# Patient Record
Sex: FEMALE | Race: WHITE | NOT HISPANIC OR LATINO | URBAN - METROPOLITAN AREA
[De-identification: names, ages, dates, MRNs, and addresses within clinical notes are randomized per-mention and may not be internally consistent; named-entity substitution may affect disease eponyms.]

---

## 2023-03-21 LAB — CHORIOGONADOTROPIN (MIU/ML) IN SER/PLAS: 260 MIU/ML

## 2023-03-23 LAB — CHORIOGONADOTROPIN (MIU/ML) IN SER/PLAS: 819 IU/L

## 2023-04-07 LAB
ABO GROUP (TYPE) IN BLOOD: NORMAL
ANTIBODY SCREEN: NORMAL
ERYTHROCYTE DISTRIBUTION WIDTH (RATIO) BY AUTOMATED COUNT: 12.6 % (ref 11.5–14.5)
ERYTHROCYTE MEAN CORPUSCULAR HEMOGLOBIN CONCENTRATION (G/DL) BY AUTOMATED: 33.3 G/DL (ref 32–36)
ERYTHROCYTE MEAN CORPUSCULAR VOLUME (FL) BY AUTOMATED COUNT: 86 FL (ref 80–100)
ERYTHROCYTES (10*6/UL) IN BLOOD BY AUTOMATED COUNT: 4.48 X10E12/L (ref 4–5.2)
HEMATOCRIT (%) IN BLOOD BY AUTOMATED COUNT: 38.7 % (ref 36–46)
HEMOGLOBIN (G/DL) IN BLOOD: 12.9 G/DL (ref 12–16)
HEPATITIS B VIRUS SURFACE AG PRESENCE IN SERUM: NONREACTIVE
HEPATITIS C VIRUS AB PRESENCE IN SERUM: NONREACTIVE
HIV 1/ 2 AG/AB SCREEN: NONREACTIVE
LEUKOCYTES (10*3/UL) IN BLOOD BY AUTOMATED COUNT: 7.1 X10E9/L (ref 4.4–11.3)
NRBC (PER 100 WBCS) BY AUTOMATED COUNT: 0 /100 WBC (ref 0–0)
PLATELETS (10*3/UL) IN BLOOD AUTOMATED COUNT: 229 X10E9/L (ref 150–450)
REFLEX ADDED, ANEMIA PANEL: NORMAL
RH FACTOR: NORMAL
RUBELLA VIRUS IGG AB: POSITIVE
SYPHILIS TOTAL AB: NONREACTIVE
URINE CULTURE: NORMAL

## 2023-04-11 LAB
HEMOGLOBIN A2: 2.8 %
HEMOGLOBIN A: 96.8 %
HEMOGLOBIN F: 0.4 %
HEMOGLOBIN IDENTIFICATION INTERPRETATION: NORMAL
PATH REVIEW-HGB IDENTIFICATION: NORMAL

## 2023-05-11 LAB — SMA RESULT: NORMAL

## 2023-05-12 LAB — CF RESULTS: NORMAL

## 2023-06-06 LAB
CHLAMYDIA TRACH., AMPLIFIED: NEGATIVE
N. GONORRHEA, AMPLIFIED: NEGATIVE

## 2023-08-17 LAB
ERYTHROCYTE DISTRIBUTION WIDTH (RATIO) BY AUTOMATED COUNT: NORMAL
ERYTHROCYTE MEAN CORPUSCULAR HEMOGLOBIN CONCENTRATION (G/DL) BY AUTOMATED: NORMAL
ERYTHROCYTE MEAN CORPUSCULAR VOLUME (FL) BY AUTOMATED COUNT: NORMAL
ERYTHROCYTES (10*6/UL) IN BLOOD BY AUTOMATED COUNT: NORMAL
GLUCOSE, 1 HR SCREEN, PREG: 84 MG/DL
HEMATOCRIT (%) IN BLOOD BY AUTOMATED COUNT: NORMAL
HEMOGLOBIN (G/DL) IN BLOOD: NORMAL
LEUKOCYTES (10*3/UL) IN BLOOD BY AUTOMATED COUNT: NORMAL
NRBC (PER 100 WBCS) BY AUTOMATED COUNT: NORMAL
PLATELETS (10*3/UL) IN BLOOD AUTOMATED COUNT: NORMAL
REFLEX ADDED, ANEMIA PANEL: NORMAL
SYPHILIS TOTAL AB: NONREACTIVE

## 2023-09-14 LAB
ERYTHROCYTE DISTRIBUTION WIDTH (RATIO) BY AUTOMATED COUNT: 13.2 % (ref 11.5–14.5)
ERYTHROCYTE MEAN CORPUSCULAR HEMOGLOBIN CONCENTRATION (G/DL) BY AUTOMATED: 33.4 G/DL (ref 32–36)
ERYTHROCYTE MEAN CORPUSCULAR VOLUME (FL) BY AUTOMATED COUNT: 88 FL (ref 80–100)
ERYTHROCYTES (10*6/UL) IN BLOOD BY AUTOMATED COUNT: 3.52 X10E12/L (ref 4–5.2)
HEMATOCRIT (%) IN BLOOD BY AUTOMATED COUNT: 31.1 % (ref 36–46)
HEMOGLOBIN (G/DL) IN BLOOD: 10.4 G/DL (ref 12–16)
LEUKOCYTES (10*3/UL) IN BLOOD BY AUTOMATED COUNT: 11.6 X10E9/L (ref 4.4–11.3)
NRBC (PER 100 WBCS) BY AUTOMATED COUNT: 0 /100 WBC (ref 0–0)
PLATELETS (10*3/UL) IN BLOOD AUTOMATED COUNT: 185 X10E9/L (ref 150–450)

## 2023-10-11 ENCOUNTER — PHARMACY VISIT (OUTPATIENT)
Dept: PHARMACY | Facility: CLINIC | Age: 38
End: 2023-10-11
Payer: COMMERCIAL

## 2023-10-11 PROCEDURE — RXMED WILLOW AMBULATORY MEDICATION CHARGE

## 2023-10-12 PROBLEM — O99.342: Status: ACTIVE | Noted: 2023-10-12

## 2023-10-12 PROBLEM — N92.6 MISSED MENSES: Status: ACTIVE | Noted: 2023-10-12

## 2023-10-12 PROBLEM — O09.521 MULTIGRAVIDA OF ADVANCED MATERNAL AGE IN FIRST TRIMESTER (HHS-HCC): Status: ACTIVE | Noted: 2023-10-12

## 2023-10-12 PROBLEM — M26.69 TEMPOROMANDIBULAR JAW DYSFUNCTION: Status: ACTIVE | Noted: 2023-10-12

## 2023-10-12 PROBLEM — M54.2 CERVICALGIA OF OCCIPITO-ATLANTO-AXIAL REGION: Status: ACTIVE | Noted: 2023-10-12

## 2023-10-12 PROBLEM — R04.0 EPISTAXIS: Status: ACTIVE | Noted: 2023-10-12

## 2023-10-12 PROBLEM — M79.9 POSTURAL STRAIN: Status: ACTIVE | Noted: 2023-10-12

## 2023-10-12 PROBLEM — F32.A DEPRESSION: Status: ACTIVE | Noted: 2023-10-12

## 2023-10-12 PROBLEM — O26.843 UTERINE SIZE-DATE DISCREPANCY IN THIRD TRIMESTER (HHS-HCC): Status: ACTIVE | Noted: 2023-10-12

## 2023-10-12 PROBLEM — M79.18 MYALGIA, OTHER SITE: Status: ACTIVE | Noted: 2023-10-12

## 2023-10-12 PROBLEM — O16.9: Status: ACTIVE | Noted: 2023-10-12

## 2023-10-12 PROBLEM — O34.30: Status: ACTIVE | Noted: 2023-10-12

## 2023-10-12 PROBLEM — M99.09 SEGMENTAL AND SOMATIC DYSFUNCTION: Status: ACTIVE | Noted: 2023-10-12

## 2023-10-12 PROBLEM — O92.70 LACTATION DISORDER (HHS-HCC): Status: ACTIVE | Noted: 2023-10-12

## 2023-10-12 PROBLEM — G44.86 HEADACHE, CERVICOGENIC: Status: ACTIVE | Noted: 2023-10-12

## 2023-10-12 PROBLEM — F41.1 GENERALIZED ANXIETY DISORDER: Status: ACTIVE | Noted: 2023-10-12

## 2023-10-12 PROBLEM — Z98.891 S/P CESAREAN SECTION: Status: ACTIVE | Noted: 2023-10-12

## 2023-10-12 PROBLEM — F41.9: Status: ACTIVE | Noted: 2023-10-12

## 2023-10-12 PROBLEM — O03.9: Status: ACTIVE | Noted: 2023-10-12

## 2023-10-12 PROBLEM — N89.8 VAGINAL DISCHARGE: Status: ACTIVE | Noted: 2023-10-12

## 2023-10-12 RX ORDER — PNV NO.95/FERROUS FUM/FOLIC AC 28MG-0.8MG
TABLET ORAL
COMMUNITY

## 2023-10-12 RX ORDER — MUPIROCIN 20 MG/G
OINTMENT TOPICAL
COMMUNITY
Start: 2023-03-22 | End: 2023-10-13 | Stop reason: ALTCHOICE

## 2023-10-13 ENCOUNTER — LAB (OUTPATIENT)
Dept: LAB | Facility: LAB | Age: 38
End: 2023-10-13
Payer: COMMERCIAL

## 2023-10-13 ENCOUNTER — ROUTINE PRENATAL (OUTPATIENT)
Dept: OBSTETRICS AND GYNECOLOGY | Facility: CLINIC | Age: 38
End: 2023-10-13
Payer: COMMERCIAL

## 2023-10-13 VITALS — BODY MASS INDEX: 30.97 KG/M2 | DIASTOLIC BLOOD PRESSURE: 73 MMHG | SYSTOLIC BLOOD PRESSURE: 111 MMHG | WEIGHT: 163.9 LBS

## 2023-10-13 DIAGNOSIS — Z34.80 SUPERVISION OF OTHER NORMAL PREGNANCY, ANTEPARTUM (HHS-HCC): ICD-10-CM

## 2023-10-13 DIAGNOSIS — O99.013 ANEMIA DURING PREGNANCY IN THIRD TRIMESTER (HHS-HCC): ICD-10-CM

## 2023-10-13 DIAGNOSIS — O09.523 MULTIGRAVIDA OF ADVANCED MATERNAL AGE IN THIRD TRIMESTER (HHS-HCC): ICD-10-CM

## 2023-10-13 DIAGNOSIS — O34.33 CERVICAL CERCLAGE SUTURE PRESENT IN THIRD TRIMESTER (HHS-HCC): ICD-10-CM

## 2023-10-13 DIAGNOSIS — F41.1 GENERALIZED ANXIETY DISORDER: Primary | ICD-10-CM

## 2023-10-13 PROBLEM — O03.9: Status: RESOLVED | Noted: 2023-10-12 | Resolved: 2023-10-13

## 2023-10-13 PROBLEM — Z34.90 ENCOUNTER FOR SUPERVISION OF NORMAL PREGNANCY, ANTEPARTUM (HHS-HCC): Status: ACTIVE | Noted: 2023-10-13

## 2023-10-13 PROBLEM — M26.69 TEMPOROMANDIBULAR JAW DYSFUNCTION: Status: RESOLVED | Noted: 2023-10-12 | Resolved: 2023-10-13

## 2023-10-13 PROBLEM — O92.70 LACTATION DISORDER (HHS-HCC): Status: RESOLVED | Noted: 2023-10-12 | Resolved: 2023-10-13

## 2023-10-13 PROBLEM — M54.2 CERVICALGIA OF OCCIPITO-ATLANTO-AXIAL REGION: Status: RESOLVED | Noted: 2023-10-12 | Resolved: 2023-10-13

## 2023-10-13 PROBLEM — O34.30: Status: RESOLVED | Noted: 2023-10-12 | Resolved: 2023-10-13

## 2023-10-13 PROBLEM — M99.09 SEGMENTAL AND SOMATIC DYSFUNCTION: Status: RESOLVED | Noted: 2023-10-12 | Resolved: 2023-10-13

## 2023-10-13 PROBLEM — F32.A DEPRESSION: Status: RESOLVED | Noted: 2023-10-12 | Resolved: 2023-10-13

## 2023-10-13 PROBLEM — O09.521 MULTIGRAVIDA OF ADVANCED MATERNAL AGE IN FIRST TRIMESTER (HHS-HCC): Status: RESOLVED | Noted: 2023-10-12 | Resolved: 2023-10-13

## 2023-10-13 LAB
ERYTHROCYTE [DISTWIDTH] IN BLOOD BY AUTOMATED COUNT: 13 % (ref 11.5–14.5)
HCT VFR BLD AUTO: 31.4 % (ref 36–46)
HGB BLD-MCNC: 10.1 G/DL (ref 12–16)
MCH RBC QN AUTO: 28.2 PG (ref 26–34)
MCHC RBC AUTO-ENTMCNC: 32.2 G/DL (ref 32–36)
MCV RBC AUTO: 88 FL (ref 80–100)
NRBC BLD-RTO: 0 /100 WBCS (ref 0–0)
PLATELET # BLD AUTO: 174 X10*3/UL (ref 150–450)
PMV BLD AUTO: 10.4 FL (ref 7.5–11.5)
RBC # BLD AUTO: 3.58 X10*6/UL (ref 4–5.2)
WBC # BLD AUTO: 10.5 X10*3/UL (ref 4.4–11.3)

## 2023-10-13 PROCEDURE — 99213 OFFICE O/P EST LOW 20 MIN: CPT | Mod: GC | Performed by: STUDENT IN AN ORGANIZED HEALTH CARE EDUCATION/TRAINING PROGRAM

## 2023-10-13 PROCEDURE — 36415 COLL VENOUS BLD VENIPUNCTURE: CPT

## 2023-10-13 PROCEDURE — 0501F PRENATAL FLOW SHEET: CPT | Performed by: STUDENT IN AN ORGANIZED HEALTH CARE EDUCATION/TRAINING PROGRAM

## 2023-10-13 PROCEDURE — 85027 COMPLETE CBC AUTOMATED: CPT

## 2023-10-13 RX ORDER — ESCITALOPRAM OXALATE 20 MG/1
30 TABLET ORAL DAILY
Qty: 90 TABLET | Refills: 3 | COMMUNITY
Start: 2023-10-13 | End: 2023-10-26 | Stop reason: SDUPTHER

## 2023-10-13 NOTE — PROGRESS NOTES
"Subjective   Patient ID [id number]   Addie Cook is a 37 y.o.  at 33w3d with a working estimated date of delivery of 2023, by Last Menstrual Period who presents for a routine prenatal visit. She denies vaginal bleeding, leakage of fluid, decreased fetal movements, or contractions.      Objective   Physical Exam:   Weight: 74.3 kg (163 lb 14.4 oz)  Expected Total Weight Gain: Could not be calculated   Pregravid BMI: Could not be calculated  BP: 111/73  Fetal Heart Rate: 140 Fundal Height (cm): 33 cm             Prenatal Labs  Urine Dip:  No results found for: \"KETONESU\", \"GLUCOSEUR\", \"LEUKOCYTESUR\"  Lab Results   Component Value Date    HGB 10.4 (L) 2023    HCT 31.1 (L) 2023    HEPBSAG NONREACTIVE 2023     No results found for: \"PAPPA\", \"AFP\", \"HCG\", \"ESTRIOL\", \"INHBA\"  No results found for: \"GLUF\", \"GLUT1\", \"COBHUQB8CX\", \"ULHPXLT8FX\"         Assessment/Plan   Problem List Items Addressed This Visit             ICD-10-CM    Generalized anxiety disorder - Primary F41.1    Anemia during pregnancy in third trimester O99.013     Check CBC w/iron studies today          Birth trauma P15.9    Cervical cerclage suture present in third trimester O34.33     No sx today, discussed removal at 36w, pt to schedule apt today         Multigravida of advanced maternal age in third trimester O09.523    Encounter for supervision of normal pregnancy, antepartum Z34.90     Reviewed how to wear belly band  COVID booster not available in office, directed to outside pharmacy          Follow up in 2 weeks for a routine prenatal visit.  "

## 2023-10-13 NOTE — ASSESSMENT & PLAN NOTE
Reviewed how to wear belly band  COVID booster not available in office, directed to outside pharmacy

## 2023-10-26 ENCOUNTER — ROUTINE PRENATAL (OUTPATIENT)
Dept: OBSTETRICS AND GYNECOLOGY | Facility: CLINIC | Age: 38
End: 2023-10-26
Payer: COMMERCIAL

## 2023-10-26 ENCOUNTER — OFFICE VISIT (OUTPATIENT)
Dept: BEHAVIORAL HEALTH | Facility: CLINIC | Age: 38
End: 2023-10-26
Payer: COMMERCIAL

## 2023-10-26 VITALS
SYSTOLIC BLOOD PRESSURE: 119 MMHG | WEIGHT: 166 LBS | HEART RATE: 105 BPM | BODY MASS INDEX: 31.37 KG/M2 | DIASTOLIC BLOOD PRESSURE: 78 MMHG

## 2023-10-26 DIAGNOSIS — F33.1 MAJOR DEPRESSIVE DISORDER, RECURRENT EPISODE, MODERATE (MULTI): ICD-10-CM

## 2023-10-26 DIAGNOSIS — O09.523 MULTIGRAVIDA OF ADVANCED MATERNAL AGE IN THIRD TRIMESTER (HHS-HCC): ICD-10-CM

## 2023-10-26 DIAGNOSIS — Z34.80 SUPERVISION OF OTHER NORMAL PREGNANCY, ANTEPARTUM (HHS-HCC): ICD-10-CM

## 2023-10-26 DIAGNOSIS — F42.2 MIXED OBSESSIONAL THOUGHTS AND ACTS: ICD-10-CM

## 2023-10-26 DIAGNOSIS — O34.33 CERVICAL CERCLAGE SUTURE PRESENT IN THIRD TRIMESTER (HHS-HCC): ICD-10-CM

## 2023-10-26 DIAGNOSIS — O99.013 ANEMIA DURING PREGNANCY IN THIRD TRIMESTER (HHS-HCC): Primary | ICD-10-CM

## 2023-10-26 DIAGNOSIS — F41.1 GENERALIZED ANXIETY DISORDER: ICD-10-CM

## 2023-10-26 PROCEDURE — 99214 OFFICE O/P EST MOD 30 MIN: CPT | Performed by: STUDENT IN AN ORGANIZED HEALTH CARE EDUCATION/TRAINING PROGRAM

## 2023-10-26 PROCEDURE — 1036F TOBACCO NON-USER: CPT | Performed by: STUDENT IN AN ORGANIZED HEALTH CARE EDUCATION/TRAINING PROGRAM

## 2023-10-26 PROCEDURE — 99213 OFFICE O/P EST LOW 20 MIN: CPT | Mod: GC | Performed by: STUDENT IN AN ORGANIZED HEALTH CARE EDUCATION/TRAINING PROGRAM

## 2023-10-26 PROCEDURE — 99214 OFFICE O/P EST MOD 30 MIN: CPT | Mod: AM | Performed by: STUDENT IN AN ORGANIZED HEALTH CARE EDUCATION/TRAINING PROGRAM

## 2023-10-26 PROCEDURE — 0501F PRENATAL FLOW SHEET: CPT | Performed by: STUDENT IN AN ORGANIZED HEALTH CARE EDUCATION/TRAINING PROGRAM

## 2023-10-26 RX ORDER — DIPHENHYDRAMINE HYDROCHLORIDE 50 MG/ML
50 INJECTION INTRAMUSCULAR; INTRAVENOUS AS NEEDED
OUTPATIENT
Start: 2023-10-26

## 2023-10-26 RX ORDER — FAMOTIDINE 10 MG/ML
20 INJECTION INTRAVENOUS ONCE AS NEEDED
OUTPATIENT
Start: 2023-10-26

## 2023-10-26 RX ORDER — ESCITALOPRAM OXALATE 20 MG/1
30 TABLET ORAL DAILY
Qty: 90 TABLET | Refills: 3 | Status: SHIPPED | OUTPATIENT
Start: 2023-10-26 | End: 2024-01-18 | Stop reason: SDUPTHER

## 2023-10-26 RX ORDER — EPINEPHRINE 0.3 MG/.3ML
0.3 INJECTION SUBCUTANEOUS EVERY 5 MIN PRN
OUTPATIENT
Start: 2023-10-26

## 2023-10-26 RX ORDER — ALBUTEROL SULFATE 0.83 MG/ML
3 SOLUTION RESPIRATORY (INHALATION) AS NEEDED
OUTPATIENT
Start: 2023-10-26

## 2023-10-26 ASSESSMENT — PAIN SCALES - GENERAL: PAINLEVEL_OUTOF10: 0 - NO PAIN

## 2023-10-26 ASSESSMENT — PAIN - FUNCTIONAL ASSESSMENT: PAIN_FUNCTIONAL_ASSESSMENT: 0-10

## 2023-10-26 NOTE — PROGRESS NOTES
"Subjective    All Individuals Present: Patient and Provider (Encounter Provider)     ID: Addie Cook is a 37 y.o. female with history of MDD, HI, OCD, and trauma.     Interval History/HPI/PFSH:  @35wga    Getting nervous as delivery approaches, but feeling better with time passing dates from prior pregnancy where she had loss. Has been sleeping better overall. Does not desire to change escitalopram dose.    More stressed with work and looking forward to having some time away.    Denies SI, HI, AVH.     Medication side effects: None Reported     Review of Systems  Constitutional: Positive for fatigue  Psychiatric: Positive for anxiety, Negative for depression, irritability, loss of interest in favorite activities, mood swings, and sleep disturbance  Neurological: Negative   Other: @35wga, back strain, mild nausea    Objective   LMP 02/21/2023 (Exact Date)   Wt Readings from Last 4 Encounters:   10/13/23 74.3 kg (163 lb 14.4 oz)   07/18/23 67.6 kg (149 lb)   05/18/23 63 kg (139 lb)   05/08/23 63.1 kg (139 lb 1 oz)       Mental Status Exam  General Appearance: Well groomed, appropriate eye contact.  Attitude/Behavior: Cooperative, conversant, engaged, and with good eye contact.  Motor: No psychomotor agitation or retardation, no tremor or other abnormal movements.  Speech: Normal rate, volume, prosody  Gait/Station: Within normal limits  Mood: \"nervous but okay\".  Affect: Anxious and Congruent with mood and topic of conversation  Thought Process: Linear, goal directed  Thought Associations: No loosening of associations  Thought Content: normal  Sensorium: Alert and oriented to person, place, time and situation  Insight: Intact, as evidenced by awareness of symptom triggers  Judgment: Intact  Cognition: Cognitively intact to conversational testing with respect to attention, orientation, fund of knowledge, recent and remote memory, and language.  Testing: N/A.    Laboratory/Imaging/Diagnostic Tests       Assessment/Plan "   Overall Formulation and Differential Diagnosis:  Addie Cook is a 37 y.o. female who meets criteria for MDD, HI, OCD, and trauma-related disorder.  Interval Assessment:   female currently pregnant at 35 EGA (ERNESTINE 2023) s/p cerclage 2023 for cervical insufficiency with reported psychiatric history of depression and HI with panic (treated with escitalopram 20 mg daily since 2018) who presents for follow up. Had been overwhelmed nearing the delivery date, and had been experiencing more sleep onset difficulty due to a combination of physical symptoms of pregnancy and anxiety, now better with OTC agents. Pt is not interested in increasing Lexapro further given nausea. Able to contract for safety.     Impression:  MDD moderate with peripartum exacerbation  HI  OCD  Grief following fetal loss  Rule out trauma and stressor-related disorder     PLAN:  - continue escitalopram 30 mg PO daily  - continue individual psychotherapy  - Patient understands in case of medical/psychiatric emergency to call 911 or go to nearest ER  - Follow up in 4 weeks    Risk Assessment:  Imminent Risk of Suicide or Serious Self-Injury: Low Risk -- Risk factors include: History of trauma or abuse  Protective factors include:Denies current suicidal ideation, Denies history of suicide attempts , Future-oriented talk , Willingness to seek help and support , Skills in problem solving, conflict resolution, and nonviolent handling of disputes, Cultural and Cheondoism beliefs that discourage suicide and support self-preservation , Access to a variety of clinical interventions , Receiving and engaged in care for mental, physical, and substance use disorders , History of adhering to treatment recommendations and/or prescribed medication regimen , Support through ongoing medical and mental healthcare relationships , Current/history of good response to treatment/meds , Interpersonal relationships and supports, e.g., family, friends, peers,  community , and Restricted access to firearms or other lethal means of suicide   Imminent Risk of Violence or Homicide: Low Risk - Risk factors include: No significant risk factors identified on screening. Protective factors include: Lack of known history of harm to others , Lack of known history of violent ideation , Lack of known access to firearms , Sense of community, availability/access to resources and support , Sense of optimism, hope , Interpersonal competence , Affect regulation , Sense of self-efficacy, internal locus of control , and Positive, pro-social family/peer network   Treatment Plan:  There are no recently modified care plans to display for this patient.      Attestation Statements   Number of Minutes Spent Performing Evaluation & Management (E&M): 30

## 2023-10-26 NOTE — PROGRESS NOTES
Ob Follow-up  10/26/2023      SUBJECTIVE    HPI: Addie Cook is a 37 y.o.  at 35w2d here for RPNV.  She has no contractions, vaginal bleeding, or LOF. Reports normal fetal movement. Patient reports increased fatigue, SOB, and lightheadedness. She had been prescribed PO iron for anemia but is experiencing nausea and is not tolerating it.     OBJECTIVE  Visit Vitals  /78   Pulse 105   Wt 75.3 kg (166 lb)   LMP 2023 (Exact Date)   BMI 31.37 kg/m²   OB Status Pregnant   Smoking Status Never Assessed   BSA 1.8 m²      FHT: 162 BPM  Fundal height: 35cm    ASSESSMENT & PLAN  Addie Cook is a 37 y.o.  at 35w2d here for the following concerns we addressed today:    Problem List Items Addressed This Visit       Anemia during pregnancy in third trimester - Primary    Overview     Last Hgb 10.1 (10/13) down from 10.4 (), not tolerating PO iron due to nausea. Endorses fatigue, SOB, and lightheadedness suggestive of anemia.  Plan for IV iron.         Cervical cerclage suture present in third trimester    Overview     Cervical insufficience causing G2 loss at 19w  Harden cerclage placed 23  Cerclage removal scheduled for 10/31/23         Multigravida of advanced maternal age in third trimester    Encounter for supervision of normal pregnancy, antepartum    Overview     Plans for , , taking birthing classes, desires epidural but would like to delay obtaining   PPBC: natural family planning  s/p Tdap, flu shot, and COVID booster  Plans for breastfeeding          Relevant Orders    Follow Up In Obstetrics      No orders of the defined types were placed in this encounter.     RTC in 1 week.    Seen and discussed with Dr. Lyle Hernandez MD

## 2023-10-26 NOTE — PROGRESS NOTES
Ob Follow-up  10/26/2023      SUBJECTIVE    HPI: Addie Cook is a 37 y.o.  at 35w2d here for RPNV.  She has no contractions, vaginal bleeding, or LOF. Reports normal fetal movement. Patient reports increased fatigue, SOB, and lightheadedness. She had been prescribed PO iron for anemia but is experiencing nausea.   She plans for  and breastfeeding after delivery. She intends to use natural family planning for birth control postpartum.        OBJECTIVE  Visit Vitals  /78   Pulse 105   Wt 75.3 kg (166 lb)   LMP 2023 (Exact Date)   BMI 31.37 kg/m²   OB Status Pregnant   Smoking Status Never Assessed   BSA 1.8 m²      FHT: 35 cm      ASSESSMENT & PLAN  Addie Cook is a 37 y.o.  at 35w2d here for the following concerns we addressed today:    Problem List Items Addressed This Visit       Anemia during pregnancy in third trimester - Primary    Overview     Last Hgb 10.1 (10/13) down from 10.4 (), not tolerating PO iron due to nausea. Endorses fatigue, SOB, and lightheadedness suggestive of anemia.  Plan to start IV iron.         Cervical cerclage suture present in third trimester    Overview     Cervical insufficience causing G2 loss at 19w  Harden cerclage placed 23  Cerclage removal scheduled for 10/31/23         Multigravida of advanced maternal age in third trimester    Encounter for supervision of normal pregnancy, antepartum    Overview     Plans for , , taking birthing classes, desires epidural but would like to delay obtaining   PPBC: natural family planning  s/p Tdap, flu shot, and COVID booster         Relevant Orders    Follow Up In Obstetrics         No orders of the defined types were placed in this encounter.       RTC in 1 week.

## 2023-10-27 NOTE — PROGRESS NOTES
I saw and evaluated the patient. I personally obtained the key and critical portions of the history and physical exam or was physically present for key and critical portions performed by the resident/fellow. I reviewed the resident/fellow's documentation and discussed the patient with the resident/fellow. I agree with the resident/fellow's medical decision making as documented in the note.    Maida Alvarenga MD

## 2023-10-30 ENCOUNTER — ROUTINE PRENATAL (OUTPATIENT)
Dept: OBSTETRICS AND GYNECOLOGY | Facility: CLINIC | Age: 38
End: 2023-10-30
Payer: COMMERCIAL

## 2023-10-30 VITALS — SYSTOLIC BLOOD PRESSURE: 114 MMHG | DIASTOLIC BLOOD PRESSURE: 71 MMHG | BODY MASS INDEX: 31.84 KG/M2 | WEIGHT: 168.5 LBS

## 2023-10-30 DIAGNOSIS — Z34.80 SUPERVISION OF OTHER NORMAL PREGNANCY, ANTEPARTUM (HHS-HCC): ICD-10-CM

## 2023-10-30 DIAGNOSIS — O34.33 CERVICAL CERCLAGE SUTURE PRESENT IN THIRD TRIMESTER (HHS-HCC): ICD-10-CM

## 2023-10-30 DIAGNOSIS — O99.013 ANEMIA DURING PREGNANCY IN THIRD TRIMESTER (HHS-HCC): ICD-10-CM

## 2023-10-30 DIAGNOSIS — O09.523 MULTIGRAVIDA OF ADVANCED MATERNAL AGE IN THIRD TRIMESTER (HHS-HCC): ICD-10-CM

## 2023-10-30 LAB
CLUE CELLS VAG LPF-#/AREA: NORMAL /[LPF]
NUGENT SCORE: 0
YEAST VAG WET PREP-#/AREA: NORMAL

## 2023-10-30 PROCEDURE — 87081 CULTURE SCREEN ONLY: CPT | Performed by: STUDENT IN AN ORGANIZED HEALTH CARE EDUCATION/TRAINING PROGRAM

## 2023-10-30 PROCEDURE — 87205 SMEAR GRAM STAIN: CPT | Performed by: STUDENT IN AN ORGANIZED HEALTH CARE EDUCATION/TRAINING PROGRAM

## 2023-10-30 PROCEDURE — 99214 OFFICE O/P EST MOD 30 MIN: CPT | Mod: GC | Performed by: STUDENT IN AN ORGANIZED HEALTH CARE EDUCATION/TRAINING PROGRAM

## 2023-10-30 PROCEDURE — 0501F PRENATAL FLOW SHEET: CPT | Performed by: STUDENT IN AN ORGANIZED HEALTH CARE EDUCATION/TRAINING PROGRAM

## 2023-10-30 NOTE — PROGRESS NOTES
Subjective   Patient ID [id number]   Addie Cook is a 37 y.o.  at 35w6d with a working estimated date of delivery of 2023, by Last Menstrual Period who presents for a routine prenatal visit. She denies vaginal bleeding, leakage of fluid, decreased fetal movements, or contractions.    Started PO iron ~ 1 week, discussed can consider rechecking labs at next visit.   Having some vaginal irritation.    Objective   Physical Exam:   Weight: 76.4 kg (168 lb 8 oz)  Expected Total Weight Gain: Could not be calculated   Pregravid BMI: Could not be calculated  BP: 114/71  Fetal Heart Rate: 142 Fundal Height (cm): 36 cm             Prenatal Labs  Lab Results   Component Value Date    HGB 10.1 (L) 10/13/2023    HCT 31.4 (L) 10/13/2023    ABO O 2023    HEPBSAG NONREACTIVE 2023          Assessment/Plan   Medical Problems       Problem List       Generalized anxiety disorder    Overview Signed 10/13/2023  8:56 AM by Barak Valenzuela MD     On lexapro, related to birth trauma         S/P  section    Overview Addendum 10/30/2023  1:38 PM by Jennifer Le MD     G1 - arrest of descent, baby OP 8lb8oz  Desires TOLAC, MFMU 46.7 vs 84.8% (if including 19 wk )          Anemia during pregnancy in third trimester    Overview Addendum 10/30/2023  1:39 PM by Jennifer Le MD     Last Hgb 10.1 (10/13), started PO iron         Birth trauma    Overview Signed 10/13/2023  8:59 AM by Barak Valenzuela MD     Follows with therapist and Dr. Rollins, MDD with moderate peripartum exacerbation, HI, OCD, grief         Cervical cerclage suture present in third trimester    Overview Addendum 10/30/2023  1:40 PM by Jennifer Le MD     Cervical insufficience causing G2 loss at 19w  Harden cerclage placed 23, removed 10/30 in clinic         Multigravida of advanced maternal age in third trimester    Encounter for supervision of normal pregnancy, antepartum    Overview Addendum 10/30/2023  1:44 PM by  Jennifer Le MD     Plans for , taking birthing classes, desires epidural but would like to delay obtaining   PPBC: natural family planning  s/p Tdap, flu shot, COVID booster  Plans for    Plans for breastfeeding               Continue prenatal vitamin.  GBS taken  Vaginal irritation, vaginitis swab today  Follow up in 1 week for a routine prenatal visit.    Discussed and seen with Dr. Becca Le MD

## 2023-10-31 ENCOUNTER — APPOINTMENT (OUTPATIENT)
Dept: OBSTETRICS AND GYNECOLOGY | Facility: CLINIC | Age: 38
End: 2023-10-31
Payer: COMMERCIAL

## 2023-11-02 ENCOUNTER — DOCUMENTATION (OUTPATIENT)
Dept: OBSTETRICS AND GYNECOLOGY | Facility: CLINIC | Age: 38
End: 2023-11-02
Payer: COMMERCIAL

## 2023-11-02 LAB — GP B STREP GENITAL QL CULT: NORMAL

## 2023-11-02 NOTE — PROGRESS NOTES
Per [facility] , patient declining to be scheduled for IV Iron, and is taking her oral doses.  LUIS E Morfin RN

## 2023-11-06 ENCOUNTER — ROUTINE PRENATAL (OUTPATIENT)
Dept: OBSTETRICS AND GYNECOLOGY | Facility: CLINIC | Age: 38
End: 2023-11-06
Payer: COMMERCIAL

## 2023-11-06 VITALS
HEART RATE: 90 BPM | SYSTOLIC BLOOD PRESSURE: 127 MMHG | BODY MASS INDEX: 32.12 KG/M2 | DIASTOLIC BLOOD PRESSURE: 75 MMHG | WEIGHT: 170 LBS

## 2023-11-06 DIAGNOSIS — O09.523 MULTIGRAVIDA OF ADVANCED MATERNAL AGE IN THIRD TRIMESTER (HHS-HCC): ICD-10-CM

## 2023-11-06 DIAGNOSIS — O99.013 ANEMIA DURING PREGNANCY IN THIRD TRIMESTER (HHS-HCC): Primary | ICD-10-CM

## 2023-11-06 DIAGNOSIS — Z3A.36 36 WEEKS GESTATION OF PREGNANCY (HHS-HCC): ICD-10-CM

## 2023-11-06 PROCEDURE — 0501F PRENATAL FLOW SHEET: CPT

## 2023-11-06 ASSESSMENT — PAIN - FUNCTIONAL ASSESSMENT: PAIN_FUNCTIONAL_ASSESSMENT: 0-10

## 2023-11-06 ASSESSMENT — PAIN SCALES - GENERAL: PAINLEVEL_OUTOF10: 0 - NO PAIN

## 2023-11-06 NOTE — PROGRESS NOTES
"Subjective   Patient ID [id number]   Addie Cook is a 37 y.o.  at 36w6d with a working estimated date of delivery of 2023, by Last Menstrual Period who presents for a routine prenatal visit. Pt had cerclage removed at lat visit, since being last seen, she had vaginal spotting and cramps after removal, currently resolved. Denies LOF and feels good fetal movement.     She also had an episode of dizziness upon standing and sitting quickly, which spontaneously resolved.     Objective   Physical Exam:   Weight: 77.1 kg (170 lb)  Expected Total Weight Gain: Could not be calculated   Pregravid BMI: Could not be calculated  BP: 127/75  Fetal Heart Rate: 146    Fundal Height: 36cm              Prenatal Labs  Urine Dip:  No results found for: \"KETONESU\", \"GLUCOSEUR\", \"LEUKOCYTESUR\"  Lab Results   Component Value Date    HGB 10.1 (L) 10/13/2023    HCT 31.4 (L) 10/13/2023    ABO O 2023    HEPBSAG NONREACTIVE 2023     No results found for: \"PAPPA\", \"AFP\", \"HCG\", \"ESTRIOL\", \"INHBA\"  No results found for: \"GLUF\", \"GLUT1\", \"[id number]\", \"[id number]\"      Assessment/Plan     .  Problem List Items Addressed This Visit       Anemia during pregnancy in third trimester    Overview     Last Hgb 10.1 (10/13), started PO iron         Current Assessment & Plan     -Will order CBC, Reticulocyte count at next visit          Cervical cerclage suture present in third trimester    Overview     Cervical insufficience causing G2 loss at 19w  Harden cerclage placed 23, removed 10/30 in clinic         Multigravida of advanced maternal age in third trimester    Encounter for supervision of normal pregnancy, antepartum    Overview     Plans for , taking birthing classes, desires epidural but would like to delay obtaining   PPBC: natural family planning  s/p Tdap, flu shot, COVID booster  Plans for    Plans for breastfeeding   GBS negative           Follow up in 1 week for a routine prenatal visit.    D/w  " Danielle Hudson MD, PGY-2

## 2023-11-06 NOTE — PROGRESS NOTES
I saw and evaluated the patient. I personally obtained the key and critical portions of the history and physical exam or was physically present for key and critical portions performed by the resident/fellow. I reviewed the resident/fellow's documentation and discussed the patient with the resident/fellow. I agree with the resident/fellow's medical decision making as documented in the note.    Gita Hudson MD

## 2023-11-08 ENCOUNTER — TELEPHONE (OUTPATIENT)
Dept: OBSTETRICS AND GYNECOLOGY | Facility: HOSPITAL | Age: 38
End: 2023-11-08
Payer: COMMERCIAL

## 2023-11-09 LAB — REFLEX ADDED, ANEMIA PANEL: NORMAL

## 2023-11-13 ENCOUNTER — ROUTINE PRENATAL (OUTPATIENT)
Dept: OBSTETRICS AND GYNECOLOGY | Facility: CLINIC | Age: 38
End: 2023-11-13
Payer: COMMERCIAL

## 2023-11-13 ENCOUNTER — TELEPHONE (OUTPATIENT)
Dept: OBSTETRICS AND GYNECOLOGY | Facility: HOSPITAL | Age: 38
End: 2023-11-13

## 2023-11-13 ENCOUNTER — LAB (OUTPATIENT)
Dept: LAB | Facility: LAB | Age: 38
End: 2023-11-13
Payer: COMMERCIAL

## 2023-11-13 VITALS — BODY MASS INDEX: 32.74 KG/M2 | DIASTOLIC BLOOD PRESSURE: 79 MMHG | WEIGHT: 173.3 LBS | SYSTOLIC BLOOD PRESSURE: 127 MMHG

## 2023-11-13 DIAGNOSIS — Z3A.40 40 WEEKS GESTATION OF PREGNANCY (HHS-HCC): ICD-10-CM

## 2023-11-13 DIAGNOSIS — O99.019 IRON DEFICIENCY ANEMIA DURING PREGNANCY (HHS-HCC): ICD-10-CM

## 2023-11-13 DIAGNOSIS — O99.013 ANEMIA DURING PREGNANCY IN THIRD TRIMESTER (HHS-HCC): ICD-10-CM

## 2023-11-13 DIAGNOSIS — O99.013 ANEMIA DURING PREGNANCY IN THIRD TRIMESTER (HHS-HCC): Primary | ICD-10-CM

## 2023-11-13 DIAGNOSIS — D50.9 IRON DEFICIENCY ANEMIA DURING PREGNANCY (HHS-HCC): ICD-10-CM

## 2023-11-13 LAB
ERYTHROCYTE [DISTWIDTH] IN BLOOD BY AUTOMATED COUNT: 15.5 % (ref 11.5–14.5)
HCT VFR BLD AUTO: 34.4 % (ref 36–46)
HGB BLD-MCNC: 10.9 G/DL (ref 12–16)
MCH RBC QN AUTO: 27.3 PG (ref 26–34)
MCHC RBC AUTO-ENTMCNC: 31.7 G/DL (ref 32–36)
MCV RBC AUTO: 86 FL (ref 80–100)
NRBC BLD-RTO: 0 /100 WBCS (ref 0–0)
PLATELET # BLD AUTO: 170 X10*3/UL (ref 150–450)
RBC # BLD AUTO: 3.99 X10*6/UL (ref 4–5.2)
WBC # BLD AUTO: 10.9 X10*3/UL (ref 4.4–11.3)

## 2023-11-13 PROCEDURE — 85027 COMPLETE CBC AUTOMATED: CPT

## 2023-11-13 PROCEDURE — 83550 IRON BINDING TEST: CPT

## 2023-11-13 PROCEDURE — 82607 VITAMIN B-12: CPT

## 2023-11-13 PROCEDURE — 36415 COLL VENOUS BLD VENIPUNCTURE: CPT

## 2023-11-13 PROCEDURE — 85045 AUTOMATED RETICULOCYTE COUNT: CPT

## 2023-11-13 PROCEDURE — 82746 ASSAY OF FOLIC ACID SERUM: CPT

## 2023-11-13 PROCEDURE — 82728 ASSAY OF FERRITIN: CPT

## 2023-11-13 PROCEDURE — 0501F PRENATAL FLOW SHEET: CPT

## 2023-11-13 NOTE — PROGRESS NOTES
Subjective   Patient ID [id number]   Addie Cook is a 37 y.o.  at 37w6d with a working estimated date of delivery of 2023, by Last Menstrual Period who presents for a routine prenatal visit. She denies vaginal bleeding, leakage of fluid, decreased fetal movements, or contractions.    Her pregnancy is complicated by:  Anemia  AMA  Cervical insufficiency s/p cerclage and removal    Objective   Physical Exam:   Weight: 78.6 kg (173 lb 4.8 oz)  Expected Total Weight Gain: Could not be calculated   Pregravid BMI: Could not be calculated  BP: 127/79 (HR 98)  Fetal Heart Rate: 150   Presentation: Vertex           Assessment/Plan   Requests iol on -  Anemia during pregnancy in third trimester  -     Reticulocytes and repeat CBC to assess response to therapy  Continue prenatal vitamin.  Labs reviewed.  GBS neg.  Follow up in 1 week for a routine prenatal visit.    Gita Hudson MD (patient assessed without a trainee)

## 2023-11-14 DIAGNOSIS — O99.013 ANEMIA DURING PREGNANCY IN THIRD TRIMESTER (HHS-HCC): Primary | ICD-10-CM

## 2023-11-14 DIAGNOSIS — D50.9 IRON DEFICIENCY ANEMIA DURING PREGNANCY (HHS-HCC): ICD-10-CM

## 2023-11-14 DIAGNOSIS — O09.523 MULTIGRAVIDA OF ADVANCED MATERNAL AGE IN THIRD TRIMESTER (HHS-HCC): Primary | ICD-10-CM

## 2023-11-14 DIAGNOSIS — O99.019 IRON DEFICIENCY ANEMIA DURING PREGNANCY (HHS-HCC): ICD-10-CM

## 2023-11-14 LAB
FERRITIN SERPL-MCNC: 18 NG/ML
FOLATE SERPL-MCNC: >24 NG/ML
HGB RETIC QN: 29 PG (ref 28–38)
IMMATURE RETIC FRACTION: 26.5 %
IRON SATN MFR SERPL: NORMAL %
IRON SERPL-MCNC: 97 UG/DL
REFLEX ADDED, ANEMIA PANEL: NORMAL
RETICS #: 0.11 X10*6/UL (ref 0.02–0.08)
RETICS/RBC NFR AUTO: 2.7 % (ref 0.5–2)
TIBC SERPL-MCNC: NORMAL UG/DL
UIBC SERPL-MCNC: >450 UG/DL
VIT B12 SERPL-MCNC: 199 PG/ML

## 2023-11-14 NOTE — PROGRESS NOTES
Induction of labor per Dr Hudson  Scheduled for 11/27/2023  Arrive at 1400  Patient notified  Visitor Policy reviewed.   Labor order pended to Dr Hudson

## 2023-11-20 ENCOUNTER — ROUTINE PRENATAL (OUTPATIENT)
Dept: OBSTETRICS AND GYNECOLOGY | Facility: CLINIC | Age: 38
End: 2023-11-20
Payer: COMMERCIAL

## 2023-11-20 VITALS — BODY MASS INDEX: 32.95 KG/M2 | WEIGHT: 174.4 LBS | SYSTOLIC BLOOD PRESSURE: 123 MMHG | DIASTOLIC BLOOD PRESSURE: 79 MMHG

## 2023-11-20 DIAGNOSIS — O99.013 ANEMIA DURING PREGNANCY IN THIRD TRIMESTER (HHS-HCC): ICD-10-CM

## 2023-11-20 DIAGNOSIS — Z34.80 SUPERVISION OF OTHER NORMAL PREGNANCY, ANTEPARTUM (HHS-HCC): ICD-10-CM

## 2023-11-20 DIAGNOSIS — O34.33 CERVICAL CERCLAGE SUTURE PRESENT IN THIRD TRIMESTER (HHS-HCC): ICD-10-CM

## 2023-11-20 DIAGNOSIS — O09.523 MULTIGRAVIDA OF ADVANCED MATERNAL AGE IN THIRD TRIMESTER (HHS-HCC): ICD-10-CM

## 2023-11-20 PROCEDURE — 0501F PRENATAL FLOW SHEET: CPT

## 2023-11-20 ASSESSMENT — ENCOUNTER SYMPTOMS
RESPIRATORY NEGATIVE: 0
EYES NEGATIVE: 0
GASTROINTESTINAL NEGATIVE: 0
ENDOCRINE NEGATIVE: 0
HEMATOLOGIC/LYMPHATIC NEGATIVE: 0
ALLERGIC/IMMUNOLOGIC NEGATIVE: 0
MUSCULOSKELETAL NEGATIVE: 0
PSYCHIATRIC NEGATIVE: 0
CARDIOVASCULAR NEGATIVE: 0
CONSTITUTIONAL NEGATIVE: 0
NEUROLOGICAL NEGATIVE: 0

## 2023-11-20 NOTE — PROGRESS NOTES
"Subjective   Patient ID [id number]   Addie Cook is a 37 y.o.  at 38w6d with a working estimated date of delivery of 2023, by Last Menstrual Period who presents for a routine prenatal visit. Feels more cervical pressure and irreg ctx, otherwise  denies vaginal bleeding, leakage of fluid, decreased fetal movements. Pt declines cervical exam today.     Her pregnancy is complicated by:  -h/o Harden's cerclage in s/o h/o 2nd trimester loss at 19wks and painless cervical insufficiency (placed 23), removed 10/30/23     Objective   Physical Exam:   Weight: 79.1 kg (174 lb 6.4 oz)  Expected Total Weight Gain: Could not be calculated   Pregravid BMI: Could not be calculated  BP: 123/79  Fetal Heart Rate: 143      Fundal Hght: 38.5 cm            Prenatal Labs  Urine Dip:  No results found for: \"KETONESU\", \"GLUCOSEUR\", \"LEUKOCYTESUR\"  Lab Results   Component Value Date    HGB 10.9 (L) 2023    HCT 34.4 (L) 2023    ABO O 2023    HEPBSAG NONREACTIVE 2023     No results found for: \"PAPPA\", \"AFP\", \"HCG\", \"ESTRIOL\", \"INHBA\"  No results found for: \"GLUF\", \"GLUT1\", \"[id number]\", \"[id number]\"         Assessment/Plan       .  Problem List Items Addressed This Visit       Anemia during pregnancy in third trimester    Overview     Last Hgb 10.9 (10/13), reticulocyte count 2.7%,  continue PO iron         Cervical cerclage suture present in third trimester    Overview     Cervical insufficience causing G2 loss at 19w  Harden cerclage placed 23, removed 10/30 in clinic         Encounter for supervision of normal pregnancy, antepartum    Overview     Plans for , taking birthing classes, desires epidural but would like to delay obtaining   PPBC: natural family planning  s/p Tdap, flu shot, COVID booster  Plans for    Plans for breastfeeding   GBS negative          Current Assessment & Plan     - IOL scheduled for . Labor precautions provided.          Multigravida of advanced " maternal age in third trimester     Pt scheduled for IOL on 11/27     Seen and d/w Dr. Hudson,  Danielle Fabian MD , PGY-2

## 2023-11-21 ENCOUNTER — PHARMACY VISIT (OUTPATIENT)
Dept: PHARMACY | Facility: CLINIC | Age: 38
End: 2023-11-21
Payer: COMMERCIAL

## 2023-11-21 PROCEDURE — RXMED WILLOW AMBULATORY MEDICATION CHARGE

## 2023-11-24 ENCOUNTER — ANESTHESIA (OUTPATIENT)
Dept: OBSTETRICS AND GYNECOLOGY | Facility: HOSPITAL | Age: 38
End: 2023-11-24
Payer: COMMERCIAL

## 2023-11-24 ENCOUNTER — ANESTHESIA EVENT (OUTPATIENT)
Dept: OBSTETRICS AND GYNECOLOGY | Facility: HOSPITAL | Age: 38
End: 2023-11-24
Payer: COMMERCIAL

## 2023-11-24 ENCOUNTER — HOSPITAL ENCOUNTER (INPATIENT)
Facility: HOSPITAL | Age: 38
LOS: 3 days | Discharge: HOME | End: 2023-11-27
Attending: OBSTETRICS & GYNECOLOGY | Admitting: OBSTETRICS & GYNECOLOGY
Payer: COMMERCIAL

## 2023-11-24 PROBLEM — K21.9 GASTROESOPHAGEAL REFLUX DISEASE WITHOUT ESOPHAGITIS: Status: ACTIVE | Noted: 2023-11-24

## 2023-11-24 LAB
ABO GROUP (TYPE) IN BLOOD: NORMAL
ANTIBODY SCREEN: NORMAL
ERYTHROCYTE [DISTWIDTH] IN BLOOD BY AUTOMATED COUNT: 16.7 % (ref 11.5–14.5)
HCT VFR BLD AUTO: 37.3 % (ref 36–46)
HGB BLD-MCNC: 11.9 G/DL (ref 12–16)
MCH RBC QN AUTO: 27.9 PG (ref 26–34)
MCHC RBC AUTO-ENTMCNC: 31.9 G/DL (ref 32–36)
MCV RBC AUTO: 87 FL (ref 80–100)
NRBC BLD-RTO: 0 /100 WBCS (ref 0–0)
PLATELET # BLD AUTO: 140 X10*3/UL (ref 150–450)
POC APPEARANCE, URINE: CLEAR
POC BILIRUBIN, URINE: NEGATIVE
POC BLOOD, URINE: NEGATIVE
POC COLOR, URINE: YELLOW
POC GLUCOSE, URINE: NEGATIVE MG/DL
POC KETONES, URINE: NEGATIVE MG/DL
POC LEUKOCYTES, URINE: NEGATIVE
POC NITRITE,URINE: NEGATIVE
POC PH, URINE: 7 PH
POC PROTEIN, URINE: NEGATIVE MG/DL
POC SPECIFIC GRAVITY, URINE: 1.02
POC UROBILINOGEN, URINE: 0.2 EU/DL
RBC # BLD AUTO: 4.27 X10*6/UL (ref 4–5.2)
RH FACTOR (ANTIGEN D): NORMAL
T PALLIDUM AB SER QL: NONREACTIVE
WBC # BLD AUTO: 11.8 X10*3/UL (ref 4.4–11.3)

## 2023-11-24 PROCEDURE — 85027 COMPLETE CBC AUTOMATED: CPT

## 2023-11-24 PROCEDURE — 86901 BLOOD TYPING SEROLOGIC RH(D): CPT

## 2023-11-24 PROCEDURE — 36415 COLL VENOUS BLD VENIPUNCTURE: CPT

## 2023-11-24 PROCEDURE — 99222 1ST HOSP IP/OBS MODERATE 55: CPT

## 2023-11-24 PROCEDURE — 86920 COMPATIBILITY TEST SPIN: CPT

## 2023-11-24 PROCEDURE — 10907ZC DRAINAGE OF AMNIOTIC FLUID, THERAPEUTIC FROM PRODUCTS OF CONCEPTION, VIA NATURAL OR ARTIFICIAL OPENING: ICD-10-PCS | Performed by: STUDENT IN AN ORGANIZED HEALTH CARE EDUCATION/TRAINING PROGRAM

## 2023-11-24 PROCEDURE — 1120000001 HC OB PRIVATE ROOM DAILY

## 2023-11-24 PROCEDURE — 86780 TREPONEMA PALLIDUM: CPT

## 2023-11-24 PROCEDURE — 99215 OFFICE O/P EST HI 40 MIN: CPT | Mod: 25

## 2023-11-24 RX ORDER — LIDOCAINE HYDROCHLORIDE 10 MG/ML
0.5 INJECTION INFILTRATION; PERINEURAL ONCE AS NEEDED
Status: DISCONTINUED | OUTPATIENT
Start: 2023-11-24 | End: 2023-11-25

## 2023-11-24 RX ORDER — TRANEXAMIC ACID 100 MG/ML
1000 INJECTION, SOLUTION INTRAVENOUS ONCE AS NEEDED
Status: DISCONTINUED | OUTPATIENT
Start: 2023-11-24 | End: 2023-11-25

## 2023-11-24 RX ORDER — OXYTOCIN 10 [USP'U]/ML
10 INJECTION, SOLUTION INTRAMUSCULAR; INTRAVENOUS ONCE AS NEEDED
Status: DISCONTINUED | OUTPATIENT
Start: 2023-11-24 | End: 2023-11-25

## 2023-11-24 RX ORDER — OXYTOCIN/0.9 % SODIUM CHLORIDE 30/500 ML
60 PLASTIC BAG, INJECTION (ML) INTRAVENOUS ONCE AS NEEDED
Status: COMPLETED | OUTPATIENT
Start: 2023-11-24 | End: 2023-11-25

## 2023-11-24 RX ORDER — ONDANSETRON 4 MG/1
4 TABLET, FILM COATED ORAL EVERY 6 HOURS PRN
Status: DISCONTINUED | OUTPATIENT
Start: 2023-11-24 | End: 2023-11-25

## 2023-11-24 RX ORDER — LOPERAMIDE HYDROCHLORIDE 2 MG/1
4 CAPSULE ORAL EVERY 2 HOUR PRN
Status: DISCONTINUED | OUTPATIENT
Start: 2023-11-24 | End: 2023-11-25

## 2023-11-24 RX ORDER — METOCLOPRAMIDE 10 MG/1
10 TABLET ORAL EVERY 6 HOURS PRN
Status: DISCONTINUED | OUTPATIENT
Start: 2023-11-24 | End: 2023-11-25

## 2023-11-24 RX ORDER — LORATADINE 10 MG/1
10 TABLET ORAL DAILY
Status: DISCONTINUED | OUTPATIENT
Start: 2023-11-25 | End: 2023-11-27 | Stop reason: HOSPADM

## 2023-11-24 RX ORDER — LABETALOL HYDROCHLORIDE 5 MG/ML
20 INJECTION, SOLUTION INTRAVENOUS ONCE AS NEEDED
Status: DISCONTINUED | OUTPATIENT
Start: 2023-11-24 | End: 2023-11-25

## 2023-11-24 RX ORDER — SODIUM CHLORIDE, SODIUM LACTATE, POTASSIUM CHLORIDE, CALCIUM CHLORIDE 600; 310; 30; 20 MG/100ML; MG/100ML; MG/100ML; MG/100ML
125 INJECTION, SOLUTION INTRAVENOUS CONTINUOUS
Status: DISCONTINUED | OUTPATIENT
Start: 2023-11-24 | End: 2023-11-25

## 2023-11-24 RX ORDER — METHYLERGONOVINE MALEATE 0.2 MG/ML
0.2 INJECTION INTRAVENOUS ONCE AS NEEDED
Status: DISCONTINUED | OUTPATIENT
Start: 2023-11-24 | End: 2023-11-25

## 2023-11-24 RX ORDER — MISOPROSTOL 200 UG/1
800 TABLET ORAL ONCE AS NEEDED
Status: COMPLETED | OUTPATIENT
Start: 2023-11-24 | End: 2023-11-25

## 2023-11-24 RX ORDER — TERBUTALINE SULFATE 1 MG/ML
0.25 INJECTION SUBCUTANEOUS ONCE AS NEEDED
Status: DISCONTINUED | OUTPATIENT
Start: 2023-11-24 | End: 2023-11-25

## 2023-11-24 RX ORDER — ONDANSETRON HYDROCHLORIDE 2 MG/ML
4 INJECTION, SOLUTION INTRAVENOUS EVERY 6 HOURS PRN
Status: DISCONTINUED | OUTPATIENT
Start: 2023-11-24 | End: 2023-11-25

## 2023-11-24 RX ORDER — CETIRIZINE HYDROCHLORIDE 5 MG/1
5 TABLET ORAL DAILY
COMMUNITY

## 2023-11-24 RX ORDER — METOCLOPRAMIDE HYDROCHLORIDE 5 MG/ML
10 INJECTION INTRAMUSCULAR; INTRAVENOUS EVERY 6 HOURS PRN
Status: DISCONTINUED | OUTPATIENT
Start: 2023-11-24 | End: 2023-11-25

## 2023-11-24 RX ORDER — HYDRALAZINE HYDROCHLORIDE 20 MG/ML
5 INJECTION INTRAMUSCULAR; INTRAVENOUS ONCE AS NEEDED
Status: DISCONTINUED | OUTPATIENT
Start: 2023-11-24 | End: 2023-11-25

## 2023-11-24 RX ORDER — NIFEDIPINE 10 MG/1
10 CAPSULE ORAL ONCE AS NEEDED
Status: DISCONTINUED | OUTPATIENT
Start: 2023-11-24 | End: 2023-11-25

## 2023-11-24 RX ORDER — CARBOPROST TROMETHAMINE 250 UG/ML
250 INJECTION, SOLUTION INTRAMUSCULAR ONCE AS NEEDED
Status: DISCONTINUED | OUTPATIENT
Start: 2023-11-24 | End: 2023-11-25

## 2023-11-24 RX ORDER — ACETAMINOPHEN 325 MG/1
975 TABLET ORAL ONCE
Status: COMPLETED | OUTPATIENT
Start: 2023-11-24 | End: 2023-11-24

## 2023-11-24 RX ADMIN — ACETAMINOPHEN 975 MG: 325 TABLET ORAL at 17:32

## 2023-11-24 RX ADMIN — ACETAMINOPHEN 975 MG: 325 TABLET ORAL at 23:29

## 2023-11-24 SDOH — SOCIAL STABILITY: SOCIAL INSECURITY: ARE YOU OR HAVE YOU BEEN THREATENED OR ABUSED PHYSICALLY, EMOTIONALLY, OR SEXUALLY BY ANYONE?: NO

## 2023-11-24 SDOH — ECONOMIC STABILITY: HOUSING INSECURITY: DO YOU FEEL UNSAFE GOING BACK TO THE PLACE WHERE YOU ARE LIVING?: NO

## 2023-11-24 SDOH — SOCIAL STABILITY: SOCIAL INSECURITY: HAVE YOU HAD THOUGHTS OF HARMING ANYONE ELSE?: NO

## 2023-11-24 SDOH — HEALTH STABILITY: MENTAL HEALTH: WERE YOU ABLE TO COMPLETE ALL THE BEHAVIORAL HEALTH SCREENINGS?: YES

## 2023-11-24 SDOH — SOCIAL STABILITY: SOCIAL INSECURITY: DOES ANYONE TRY TO KEEP YOU FROM HAVING/CONTACTING OTHER FRIENDS OR DOING THINGS OUTSIDE YOUR HOME?: NO

## 2023-11-24 SDOH — SOCIAL STABILITY: SOCIAL INSECURITY: HAS ANYONE EVER THREATENED TO HURT YOUR FAMILY OR YOUR PETS?: NO

## 2023-11-24 SDOH — SOCIAL STABILITY: SOCIAL INSECURITY: ARE THERE ANY APPARENT SIGNS OF INJURIES/BEHAVIORS THAT COULD BE RELATED TO ABUSE/NEGLECT?: NO

## 2023-11-24 SDOH — SOCIAL STABILITY: SOCIAL INSECURITY: PHYSICAL ABUSE: DENIES

## 2023-11-24 SDOH — SOCIAL STABILITY: SOCIAL INSECURITY: DO YOU FEEL ANYONE HAS EXPLOITED OR TAKEN ADVANTAGE OF YOU FINANCIALLY OR OF YOUR PERSONAL PROPERTY?: NO

## 2023-11-24 SDOH — HEALTH STABILITY: MENTAL HEALTH: SUICIDAL BEHAVIOR (LIFETIME): NO

## 2023-11-24 SDOH — SOCIAL STABILITY: SOCIAL INSECURITY: VERBAL ABUSE: DENIES

## 2023-11-24 SDOH — SOCIAL STABILITY: SOCIAL INSECURITY: ABUSE SCREEN: ADULT

## 2023-11-24 SDOH — HEALTH STABILITY: MENTAL HEALTH: NON-SPECIFIC ACTIVE SUICIDAL THOUGHTS (PAST 1 MONTH): NO

## 2023-11-24 SDOH — HEALTH STABILITY: MENTAL HEALTH: WISH TO BE DEAD (PAST 1 MONTH): NO

## 2023-11-24 ASSESSMENT — PAIN DESCRIPTION - LOCATION: LOCATION: HEAD

## 2023-11-24 ASSESSMENT — PAIN SCALES - GENERAL
PAINLEVEL_OUTOF10: 0 - NO PAIN
PAINLEVEL_OUTOF10: 0 - NO PAIN
PAINLEVEL_OUTOF10: 4
PAINLEVEL_OUTOF10: 0 - NO PAIN
PAINLEVEL_OUTOF10: 5 - MODERATE PAIN
PAINLEVEL_OUTOF10: 5 - MODERATE PAIN
PAINLEVEL_OUTOF10: 3

## 2023-11-24 ASSESSMENT — LIFESTYLE VARIABLES
AUDIT-C TOTAL SCORE: 0
HOW MANY STANDARD DRINKS CONTAINING ALCOHOL DO YOU HAVE ON A TYPICAL DAY: PATIENT DOES NOT DRINK
SKIP TO QUESTIONS 9-10: 1
AUDIT-C TOTAL SCORE: 0
HOW OFTEN DO YOU HAVE 6 OR MORE DRINKS ON ONE OCCASION: NEVER
HOW OFTEN DO YOU HAVE A DRINK CONTAINING ALCOHOL: NEVER

## 2023-11-24 ASSESSMENT — PAIN - FUNCTIONAL ASSESSMENT
PAIN_FUNCTIONAL_ASSESSMENT: 0-10
PAIN_FUNCTIONAL_ASSESSMENT: 0-10

## 2023-11-24 ASSESSMENT — ACTIVITIES OF DAILY LIVING (ADL): LACK_OF_TRANSPORTATION: NO

## 2023-11-24 ASSESSMENT — PATIENT HEALTH QUESTIONNAIRE - PHQ9
1. LITTLE INTEREST OR PLEASURE IN DOING THINGS: NOT AT ALL
2. FEELING DOWN, DEPRESSED OR HOPELESS: NOT AT ALL
SUM OF ALL RESPONSES TO PHQ9 QUESTIONS 1 & 2: 0

## 2023-11-24 NOTE — ANESTHESIA PREPROCEDURE EVALUATION
Patient: Addie Cook    Evaluation Method: In-person visit    Procedure Information    Date: 23  Procedure: Labor Consult         Relevant Problems   Anesthesia  No history of general anesthesia      Cardiovascular (within normal limits)      Endocrine (within normal limits)      GI   (+) Gastroesophageal reflux disease without esophagitis      /Renal (within normal limits)      Neuro/Psych   (+) Generalized anxiety disorder      Pulmonary (within normal limits)      GI/Hepatic (within normal limits)      Hematology   (+) Anemia during pregnancy in third trimester      Musculoskeletal (within normal limits)      Eyes, Ears, Nose, and Throat (within normal limits)      Infectious Disease (within normal limits)       Clinical information reviewed:   Tobacco  Allergies  Meds   Med Hx  Surg Hx   Fam Hx  Soc Hx        NPO Detail:  No data recorded     OB/Gyn Evaluation    Present Pregnancy    Patient is pregnant now.  (+) , previous  section - primary, incompetent cervix (history of cerclage this pregnancy)   Obstetric History    (+) vaginal birth after  (G1 - successful CS for failure to progress; G2 vaginal still birth)          Physical Exam    Airway  Mallampati: II  TM distance: >3 FB  Neck ROM: full     Cardiovascular    Dental    Pulmonary    Abdominal        Anesthesia Plan    ASA 2     epidural     Anesthetic plan and risks discussed with patient.  Use of blood products discussed with patient who consented to blood products.    Plan discussed with [facility].

## 2023-11-24 NOTE — H&P
Obstetrical Admission History and Physical     Addie Cook is a 37 y.o.  at 39.3 wga presenting to OB TRIAGE for leakage of fluid    Chief Complaint: No chief complaint on file.    Assessment/Plan      R/o SROM, IOL in s/o h/o IUFD and >39 wga  - SSE: negative x3  - Cervix: /-3  - Routine admission labs   - Monitor vital signs per unit protocol  - Continue assessment of maternal and fetal well-being  - Recheck as clinically indicted by maternal or fetal status  - Patient desires TOLAC, MFMU 46.7 vs 84.8% (if including 19 wk )  - Interested in IV medication, nitrous oxide, and epidural for pain management  - Needs consented and scanned    IUP at 39.3 wga  -  on presentation, now 155, category II tracing  - 1 L LR for fetal tachycardia   - Good fetal movement  - GBS negative 10/30    Maternal Well-being  - Vital signs stable and WNL  - All questions and concerns addressed   - Past birth trauma, HI, MDD, on lexapro: reports stable mood  -  coming to support patient, partner at bedside and supportive  - PPBC: natural family planning    Dispo  - Admit to [facility] L&D    Admission discussed with Dr. Viviana MD team for further management,  Dana Soriano, APRN-CNP     Active Problems:  There are no active Hospital Problems.      Pregnancy Problems (from 10/13/23 to present)       Problem Noted Resolved    Anemia during pregnancy in third trimester 10/13/2023 by Barak Valenzuela MD No    Priority:  Medium      Overview Addendum 2023 10:49 AM by Danielle Fabian MD     Last Hgb 10.9 (10/13), reticulocyte count 2.7%,  continue PO iron         Cervical cerclage suture present in third trimester 10/13/2023 by Barak Valenzuela MD No    Priority:  Medium      Overview Addendum 10/30/2023  1:40 PM by Jennifer Le MD     Cervical insufficience causing G2 loss at 19w  Harden cerclage placed 23, removed 10/30 in clinic         Multigravida of advanced maternal age in third  trimester 10/13/2023 by Barak Valenzuela MD No    Priority:  Medium      Encounter for supervision of normal pregnancy, antepartum 10/13/2023 by Barak Valenzuela MD No    Priority:  Medium      Overview Addendum 2023 10:32 AM by Danielle Fabian MD     Plans for , taking birthing classes, desires epidural but would like to delay obtaining   PPBC: natural family planning  s/p Tdap, flu shot, COVID booster  Plans for    Plans for breastfeeding   GBS negative                Subjective   Addie is here complaining of SROM at 0700 a.m. today of clear fluid. Good fetal movement. Denies vaginal bleeding., Having contractions q 10-15 minutes.     Patient had a gush of clear viscous fluid onto floor around 0700 this morning. Wearing pads for leaking fluid since.      Obstetrical History   OB History    Para Term  AB Living   3 1 1   1 1   SAB IAB Ectopic Multiple Live Births   1       1      # Outcome Date GA Lbr Tani/2nd Weight Sex Delivery Anes PTL Lv   3 Current            2 SAB  19w0d          1 Term  40w0d    CS-Unspec   VANE       Past Medical History  Past Medical History:   Diagnosis Date    12 weeks gestation of pregnancy 2022    12 weeks gestation of pregnancy    Personal history of other mental and behavioral disorders 2020    History of anxiety        Past Surgical History   Past Surgical History:   Procedure Laterality Date    OTHER SURGICAL HISTORY  2019    [place] tooth extraction       Social History  Social History     Tobacco Use    Smoking status: Never    Smokeless tobacco: Never   Substance Use Topics    Alcohol use: Not Currently     Substance and Sexual Activity   Drug Use Never       Allergies  Patient has no known allergies.     Medications  Medications Prior to Admission   Medication Sig Dispense Refill Last Dose    escitalopram (Lexapro) 20 mg tablet Take 1.5 tablets (30 mg) by mouth once daily. 90 tablet 3     ferrous sulfate 325 (65 Fe)  "MG tablet TAKE 1 TABLET BY MOUTH ONCE DAILY AS DIRECTED 90 tablet 3     prenatal vit no.124-iron-folic (Prenatal Vitamin) 27 mg iron- 800 mcg tablet Take by mouth.          Objective    Last Vitals  Temp Pulse Resp BP MAP O2 Sat     75   113/59   98 %     Physical Examination  FHR is 160 , with no accelerations, variable decels and a Category II tracing.    [place] reading:  rare contraction  VAGINA: normal appearing vagina with normal color and discharge and no lesions noted  CERVIX: 4 cm dilated, 80 % effaced, -3 station; MEMBRANES are Intact  General: Examination reveals a well developed, well nourished, female, in no acute distress. She is alert and cooperative.  Lungs: symmetrical, non-labored breathing.  Cardiac: warm, well-perfused.  Abdomen: soft, non-tender.  Extremities: no redness or tenderness in the calves or thighs.  Neurological: alert, oriented, normal speech, no focal findings or movement disorder noted.     Lab Review  Lab Results   Component Value Date    WBC 11.8 (H) 11/24/2023    HGB 11.9 (L) 11/24/2023    HCT 37.3 11/24/2023     (L) 11/24/2023     No results found for: \"GRPBSTREP\"    "

## 2023-11-24 NOTE — SIGNIFICANT EVENT
Patient on birthing ball. Feeling contractions, but tolerable right now. Amenable to SVE prior to discussing induction options to assess cervical change    Cervical Exam  Dilation: 4  Effacement (%): 80  Fetal Station: -3  Method: Manual  OB Examiner: piotr  Fetal Assessment  Movement: Present  Mode: Wireless Monitoring System  Baseline Fetal Heart Rate (bpm): 130 bpm  Baseline Classification: Normal  Variability: Moderate (Between 6 and 25 BPM)  Pattern: Accelerations  Pattern Observations: broken tracing due to maternal positioning and fetal movement  FHR Category: Category I  Multiple Births: No      Contraction Frequency: 1-5      A/P:     Labor  - Change from 1 to 4cm. Latent labor  - Discussed options for starting pitocin vs. AROM. Patient would like to consider options and continue laboring at this time.   - Epidural/IV meds/Nitrous for pain management per patient request  - CEFM; Cat 1 currently  - GBS neg, no ppx indicated    Seen & Discussed with Dr. Le, PGY4  Jessica Iraheta MD, PGY-1

## 2023-11-25 ENCOUNTER — ANESTHESIA (OUTPATIENT)
Dept: OBSTETRICS AND GYNECOLOGY | Facility: HOSPITAL | Age: 38
End: 2023-11-25
Payer: COMMERCIAL

## 2023-11-25 ENCOUNTER — ANESTHESIA EVENT (OUTPATIENT)
Dept: OBSTETRICS AND GYNECOLOGY | Facility: HOSPITAL | Age: 38
End: 2023-11-25
Payer: COMMERCIAL

## 2023-11-25 LAB
BASE EXCESS BLDCOA CALC-SCNC: -4.8 MMOL/L (ref -10.8–-0.5)
BASE EXCESS BLDCOV CALC-SCNC: -4.4 MMOL/L (ref -8.1–-0.5)
BODY TEMPERATURE: 37 DEGREES CELSIUS
BODY TEMPERATURE: 37 DEGREES CELSIUS
HCO3 BLDCOA-SCNC: 21.6 MMOL/L (ref 15–29)
HCO3 BLDCOV-SCNC: 21 MMOL/L (ref 16–26)
INHALED O2 CONCENTRATION: 21 %
INHALED O2 CONCENTRATION: 21 %
OXYHGB MFR BLDCOA: 45.6 % (ref 94–98)
OXYHGB MFR BLDCOV: 52.9 % (ref 94–98)
PCO2 BLDCOA: 44 MM HG (ref 31–75)
PCO2 BLDCOV: 39 MM HG (ref 22–53)
PH BLDCOA: 7.3 PH (ref 7.08–7.39)
PH BLDCOV: 7.34 PH (ref 7.19–7.47)
PO2 BLDCOA: 29 MM HG (ref 5–31)
PO2 BLDCOV: 31 MM HG (ref 13–37)
SAO2 % BLDCOA: 46 % (ref 3–69)
SAO2 % BLDCOV: 54 % (ref 16–84)

## 2023-11-25 PROCEDURE — 59050 FETAL MONITOR W/REPORT: CPT

## 2023-11-25 PROCEDURE — 82805 BLOOD GASES W/O2 SATURATION: CPT

## 2023-11-25 PROCEDURE — 3700000002 HC GENERAL ANESTHESIA TIME - EACH INCREMENTAL 1 MINUTE: Performed by: ANESTHESIOLOGY

## 2023-11-25 PROCEDURE — 7210000002 HC LABOR PER HOUR

## 2023-11-25 PROCEDURE — 51701 INSERT BLADDER CATHETER: CPT

## 2023-11-25 PROCEDURE — 1210000001 HC SEMI-PRIVATE ROOM DAILY

## 2023-11-25 PROCEDURE — 2500000005 HC RX 250 GENERAL PHARMACY W/O HCPCS: Performed by: STUDENT IN AN ORGANIZED HEALTH CARE EDUCATION/TRAINING PROGRAM

## 2023-11-25 PROCEDURE — 2500000001 HC RX 250 WO HCPCS SELF ADMINISTERED DRUGS (ALT 637 FOR MEDICARE OP): Performed by: STUDENT IN AN ORGANIZED HEALTH CARE EDUCATION/TRAINING PROGRAM

## 2023-11-25 PROCEDURE — 2500000004 HC RX 250 GENERAL PHARMACY W/ HCPCS (ALT 636 FOR OP/ED)

## 2023-11-25 PROCEDURE — 3700000001 HC GENERAL ANESTHESIA TIME - INITIAL BASE CHARGE: Performed by: ANESTHESIOLOGY

## 2023-11-25 PROCEDURE — 2500000004 HC RX 250 GENERAL PHARMACY W/ HCPCS (ALT 636 FOR OP/ED): Performed by: STUDENT IN AN ORGANIZED HEALTH CARE EDUCATION/TRAINING PROGRAM

## 2023-11-25 PROCEDURE — 2500000001 HC RX 250 WO HCPCS SELF ADMINISTERED DRUGS (ALT 637 FOR MEDICARE OP)

## 2023-11-25 PROCEDURE — 2720000007 HC OR 272 NO HCPCS

## 2023-11-25 PROCEDURE — A59409 PR OBSTETRICAL CARE,VAG DELIV ONLY: Performed by: ANESTHESIOLOGY

## 2023-11-25 PROCEDURE — 0DQR0ZZ REPAIR ANAL SPHINCTER, OPEN APPROACH: ICD-10-PCS | Performed by: STUDENT IN AN ORGANIZED HEALTH CARE EDUCATION/TRAINING PROGRAM

## 2023-11-25 PROCEDURE — 7100000016 HC LABOR RECOVERY PER HOUR

## 2023-11-25 PROCEDURE — 3E0H7GC INTRODUCTION OF OTHER THERAPEUTIC SUBSTANCE INTO LOWER GI, VIA NATURAL OR ARTIFICIAL OPENING: ICD-10-PCS | Performed by: STUDENT IN AN ORGANIZED HEALTH CARE EDUCATION/TRAINING PROGRAM

## 2023-11-25 RX ORDER — LABETALOL HYDROCHLORIDE 5 MG/ML
20 INJECTION, SOLUTION INTRAVENOUS ONCE AS NEEDED
Status: DISCONTINUED | OUTPATIENT
Start: 2023-11-25 | End: 2023-11-27 | Stop reason: HOSPADM

## 2023-11-25 RX ORDER — TRIPROLIDINE/PSEUDOEPHEDRINE 2.5MG-60MG
600 TABLET ORAL EVERY 6 HOURS PRN
Status: DISCONTINUED | OUTPATIENT
Start: 2023-11-25 | End: 2023-11-27 | Stop reason: HOSPADM

## 2023-11-25 RX ORDER — NIFEDIPINE 10 MG/1
10 CAPSULE ORAL ONCE AS NEEDED
Status: DISCONTINUED | OUTPATIENT
Start: 2023-11-25 | End: 2023-11-27 | Stop reason: HOSPADM

## 2023-11-25 RX ORDER — SIMETHICONE 80 MG
80 TABLET,CHEWABLE ORAL 4 TIMES DAILY PRN
Status: DISCONTINUED | OUTPATIENT
Start: 2023-11-25 | End: 2023-11-27 | Stop reason: HOSPADM

## 2023-11-25 RX ORDER — CARBOPROST TROMETHAMINE 250 UG/ML
250 INJECTION, SOLUTION INTRAMUSCULAR ONCE AS NEEDED
Status: DISCONTINUED | OUTPATIENT
Start: 2023-11-25 | End: 2023-11-27 | Stop reason: HOSPADM

## 2023-11-25 RX ORDER — POLYETHYLENE GLYCOL 3350 17 G/17G
17 POWDER, FOR SOLUTION ORAL 2 TIMES DAILY PRN
Status: DISCONTINUED | OUTPATIENT
Start: 2023-11-25 | End: 2023-11-27 | Stop reason: HOSPADM

## 2023-11-25 RX ORDER — LIDOCAINE HYDROCHLORIDE AND EPINEPHRINE 15; 5 MG/ML; UG/ML
INJECTION, SOLUTION EPIDURAL AS NEEDED
Status: DISCONTINUED | OUTPATIENT
Start: 2023-11-25 | End: 2023-11-25

## 2023-11-25 RX ORDER — BISACODYL 10 MG/1
10 SUPPOSITORY RECTAL DAILY PRN
Status: DISCONTINUED | OUTPATIENT
Start: 2023-11-25 | End: 2023-11-27 | Stop reason: HOSPADM

## 2023-11-25 RX ORDER — FENTANYL/BUPIVACAINE/NS/PF 2MCG/ML-.1
PLASTIC BAG, INJECTION (ML) INJECTION AS NEEDED
Status: DISCONTINUED | OUTPATIENT
Start: 2023-11-25 | End: 2023-11-25

## 2023-11-25 RX ORDER — MISOPROSTOL 200 UG/1
800 TABLET ORAL ONCE AS NEEDED
Status: DISCONTINUED | OUTPATIENT
Start: 2023-11-25 | End: 2023-11-27 | Stop reason: HOSPADM

## 2023-11-25 RX ORDER — ACETAMINOPHEN 325 MG/1
975 TABLET ORAL EVERY 6 HOURS
Status: DISCONTINUED | OUTPATIENT
Start: 2023-11-25 | End: 2023-11-27 | Stop reason: HOSPADM

## 2023-11-25 RX ORDER — POLYETHYLENE GLYCOL 3350 17 G/17G
17 POWDER, FOR SOLUTION ORAL DAILY
Status: DISCONTINUED | OUTPATIENT
Start: 2023-11-25 | End: 2023-11-25

## 2023-11-25 RX ORDER — DIPHENHYDRAMINE HYDROCHLORIDE 50 MG/ML
25 INJECTION INTRAMUSCULAR; INTRAVENOUS EVERY 6 HOURS PRN
Status: DISCONTINUED | OUTPATIENT
Start: 2023-11-25 | End: 2023-11-27 | Stop reason: HOSPADM

## 2023-11-25 RX ORDER — IBUPROFEN 600 MG/1
600 TABLET ORAL EVERY 6 HOURS
Status: DISCONTINUED | OUTPATIENT
Start: 2023-11-25 | End: 2023-11-25

## 2023-11-25 RX ORDER — METRONIDAZOLE 500 MG/1
500 TABLET ORAL ONCE
Status: COMPLETED | OUTPATIENT
Start: 2023-11-25 | End: 2023-11-25

## 2023-11-25 RX ORDER — ONDANSETRON HYDROCHLORIDE 2 MG/ML
4 INJECTION, SOLUTION INTRAVENOUS EVERY 6 HOURS PRN
Status: DISCONTINUED | OUTPATIENT
Start: 2023-11-25 | End: 2023-11-27 | Stop reason: HOSPADM

## 2023-11-25 RX ORDER — ONDANSETRON 4 MG/1
4 TABLET, FILM COATED ORAL EVERY 6 HOURS PRN
Status: DISCONTINUED | OUTPATIENT
Start: 2023-11-25 | End: 2023-11-27 | Stop reason: HOSPADM

## 2023-11-25 RX ORDER — DIPHENHYDRAMINE HCL 25 MG
25 CAPSULE ORAL EVERY 6 HOURS PRN
Status: DISCONTINUED | OUTPATIENT
Start: 2023-11-25 | End: 2023-11-27 | Stop reason: HOSPADM

## 2023-11-25 RX ORDER — LOPERAMIDE HYDROCHLORIDE 2 MG/1
4 CAPSULE ORAL EVERY 2 HOUR PRN
Status: DISCONTINUED | OUTPATIENT
Start: 2023-11-25 | End: 2023-11-27 | Stop reason: HOSPADM

## 2023-11-25 RX ORDER — OXYTOCIN 10 [USP'U]/ML
10 INJECTION, SOLUTION INTRAMUSCULAR; INTRAVENOUS ONCE AS NEEDED
Status: DISCONTINUED | OUTPATIENT
Start: 2023-11-25 | End: 2023-11-27 | Stop reason: HOSPADM

## 2023-11-25 RX ORDER — HYDRALAZINE HYDROCHLORIDE 20 MG/ML
5 INJECTION INTRAMUSCULAR; INTRAVENOUS ONCE AS NEEDED
Status: DISCONTINUED | OUTPATIENT
Start: 2023-11-25 | End: 2023-11-27 | Stop reason: HOSPADM

## 2023-11-25 RX ORDER — CEFAZOLIN SODIUM 2 G/100ML
2 INJECTION, SOLUTION INTRAVENOUS ONCE
Status: COMPLETED | OUTPATIENT
Start: 2023-11-25 | End: 2023-11-25

## 2023-11-25 RX ORDER — LIDOCAINE 560 MG/1
1 PATCH PERCUTANEOUS; TOPICAL; TRANSDERMAL
Status: DISCONTINUED | OUTPATIENT
Start: 2023-11-25 | End: 2023-11-27 | Stop reason: HOSPADM

## 2023-11-25 RX ORDER — METHYLERGONOVINE MALEATE 0.2 MG/ML
0.2 INJECTION INTRAVENOUS ONCE AS NEEDED
Status: DISCONTINUED | OUTPATIENT
Start: 2023-11-25 | End: 2023-11-27 | Stop reason: HOSPADM

## 2023-11-25 RX ORDER — ADHESIVE BANDAGE
10 BANDAGE TOPICAL
Status: DISCONTINUED | OUTPATIENT
Start: 2023-11-25 | End: 2023-11-27 | Stop reason: HOSPADM

## 2023-11-25 RX ORDER — TRANEXAMIC ACID 100 MG/ML
1000 INJECTION, SOLUTION INTRAVENOUS ONCE AS NEEDED
Status: DISCONTINUED | OUTPATIENT
Start: 2023-11-25 | End: 2023-11-27 | Stop reason: HOSPADM

## 2023-11-25 RX ORDER — POLYETHYLENE GLYCOL 3350 17 G/17G
17 POWDER, FOR SOLUTION ORAL 2 TIMES DAILY PRN
Status: DISCONTINUED | OUTPATIENT
Start: 2023-11-25 | End: 2023-11-25 | Stop reason: SDUPTHER

## 2023-11-25 RX ORDER — OXYTOCIN/0.9 % SODIUM CHLORIDE 30/500 ML
60 PLASTIC BAG, INJECTION (ML) INTRAVENOUS ONCE AS NEEDED
Status: DISCONTINUED | OUTPATIENT
Start: 2023-11-25 | End: 2023-11-27 | Stop reason: HOSPADM

## 2023-11-25 RX ORDER — FENTANYL/BUPIVACAINE/NS/PF 2MCG/ML-.1
PLASTIC BAG, INJECTION (ML) INJECTION CONTINUOUS PRN
Status: DISCONTINUED | OUTPATIENT
Start: 2023-11-25 | End: 2023-11-25

## 2023-11-25 RX ADMIN — ACETAMINOPHEN 975 MG: 325 TABLET ORAL at 18:55

## 2023-11-25 RX ADMIN — BENZOCAINE AND LEVOMENTHOL 1 APPLICATION: 200; 5 SPRAY TOPICAL at 18:55

## 2023-11-25 RX ADMIN — LIDOCAINE HYDROCHLORIDE AND EPINEPHRINE 3 ML: 15; 5 INJECTION, SOLUTION EPIDURAL at 01:24

## 2023-11-25 RX ADMIN — Medication 5 ML: at 01:31

## 2023-11-25 RX ADMIN — CEFAZOLIN SODIUM 2 G: 2 INJECTION, SOLUTION INTRAVENOUS at 12:52

## 2023-11-25 RX ADMIN — SODIUM CHLORIDE, POTASSIUM CHLORIDE, SODIUM LACTATE AND CALCIUM CHLORIDE 500 ML: 600; 310; 30; 20 INJECTION, SOLUTION INTRAVENOUS at 01:01

## 2023-11-25 RX ADMIN — METRONIDAZOLE 500 MG: 500 TABLET ORAL at 12:52

## 2023-11-25 RX ADMIN — MISOPROSTOL 800 MCG: 200 TABLET ORAL at 08:31

## 2023-11-25 RX ADMIN — Medication 1 EACH: at 18:55

## 2023-11-25 RX ADMIN — SODIUM CHLORIDE, POTASSIUM CHLORIDE, SODIUM LACTATE AND CALCIUM CHLORIDE 125 ML/HR: 600; 310; 30; 20 INJECTION, SOLUTION INTRAVENOUS at 05:16

## 2023-11-25 RX ADMIN — SODIUM CHLORIDE, POTASSIUM CHLORIDE, SODIUM LACTATE AND CALCIUM CHLORIDE 125 ML/HR: 600; 310; 30; 20 INJECTION, SOLUTION INTRAVENOUS at 01:32

## 2023-11-25 RX ADMIN — Medication 14 ML/HR: at 01:38

## 2023-11-25 RX ADMIN — ACETAMINOPHEN 975 MG: 325 TABLET ORAL at 12:51

## 2023-11-25 RX ADMIN — IBUPROFEN 600 MG: 200 SUSPENSION ORAL at 18:55

## 2023-11-25 RX ADMIN — ESCITALOPRAM OXALATE 30 MG: 20 TABLET ORAL at 09:06

## 2023-11-25 RX ADMIN — Medication 60 MILLI-UNITS/MIN: at 09:04

## 2023-11-25 RX ADMIN — Medication 5 ML: at 01:25

## 2023-11-25 ASSESSMENT — PAIN SCALES - GENERAL
PAINLEVEL_OUTOF10: 4
PAINLEVEL_OUTOF10: 5 - MODERATE PAIN
PAINLEVEL_OUTOF10: 1
PAINLEVEL_OUTOF10: 0 - NO PAIN
PAINLEVEL_OUTOF10: 2
PAINLEVEL_OUTOF10: 3
PAINLEVEL_OUTOF10: 1
PAINLEVEL_OUTOF10: 8
PAINLEVEL_OUTOF10: 0 - NO PAIN

## 2023-11-25 NOTE — INDIVIDUALIZED OVERALL PLAN OF CARE NOTE
At bedside for exam.  Uncomfortable with regualr ctx.    5/90/-1  Tracing Cat I      11/24/2023     6:39 PM 11/24/2023     7:12 PM 11/24/2023     8:30 PM 11/24/2023     9:30 PM 11/24/2023    10:30 PM 11/24/2023    11:31 PM 11/24/2023    11:33 PM   Vitals   Systolic 109 121    135    Diastolic 56 74    85    Heart Rate 86 81    86 114   Temp 36.5 °C (97.7 °F) 36.4 °C (97.5 °F) 36.4 °C (97.5 °F) 36.7 °C (98.1 °F) 36.6 °C (97.9 °F) 36.6 °C (97.9 °F)    Resp 18 18    16    For epidural now    Gita Bonilla MD

## 2023-11-25 NOTE — SIGNIFICANT EVENT
Second stage update:  Patient called complete at 0400 AM and pushing initiated shortly thereafter. Initially minimal descent with pushing, position changes initiated and more directed pushing initiated. Tracing Cat II, at initiation of pushing while supine, 3 min decel to 90s with subsequent recovery to 130s with moderate variability. Intermittent late decels with pushing with subsequent recovery to baseline 140s-150s and modecate variability. Currently not on pitocin.     After 1.5 hours of pushing, changed from 0 to +1 station. Given progress with pushing and moderate variability on FHT, will continue with second stage.     H/o prior C/S for 8lb 8 oz baby, [place] 7.5 lbs for this baby and maternal perception is that this baby is smaller as well.    Gita Bonilla MD

## 2023-11-25 NOTE — ANESTHESIA PROCEDURE NOTES
Epidural Block    Patient location during procedure: OB  Start time: 11/25/2023 1:18 AM  End time: 11/25/2023 1:26 AM  Reason for block: labor analgesia  Staffing  Performed: attending   Authorized by: Jerrod Marin MD    Performed by: Elvira López MD    Preanesthetic Checklist  Completed: patient identified, risks and benefits discussed, pre-op evaluation, timeout performed and sterile techniques followed  Block Timeout  RN/Licensed healthcare professional reads aloud to the Anesthesia provider and entire team: Patient identity, procedure with side and site, patient position, and as applicable the availability of implants/special equipment/special requirements.  Patient on coagulant treatment: no  Timeout performed at: 11/25/2023 1:18 AM  Block Placement  Patient position: sitting  Prep: ChloraPrep  Sterility prep: cap and gloves  Sedation level: no sedation  Patient monitoring: heart rate, continuous pulse oximetry and blood pressure  Approach: midline  Local numbing: lidocaine 1% to skin and subcutaneous tissues  Vertebral space: lumbar  Lumbar location: L3-L4  Epidural  Loss of resistance technique: saline  Guidance: landmark technique        Needle  Needle type: Tuohy   Needle gauge: 17  Needle insertion depth: 4 cm  Catheter type: end hole  Catheter size: 19 G  Catheter at skin depth: 9 cm  Catheter securement method: surgical tape    Test dose: lidocaine 1.5% with epinephrine 1-to-200,000  Test dose given at 11/25/2023 1:25 AM  Test dose: lidocaine 1.5% with epinephrine 1-to-200,000  Test dose result: no positive test dose    PCEA  Medication concentration used: 0.044% Bupivacaine with 1.25 mcg/mL Fentanyl and [id number] Epinephrine  Dose (mL): 10  Lockout (minutes): 15  1-Hour Limit (boluses/hr): 4  Basal Rate: 14        Assessment  Sensory level: T10 bilateral  Block outcome: pain improved  Number of attempts: 1  Events: no positive test dose  Procedure assessment: patient tolerated procedure well with  no immediate complications

## 2023-11-25 NOTE — DISCHARGE INSTRUCTIONS

## 2023-11-25 NOTE — L&D DELIVERY NOTE
OB Delivery Note  2023  Addie Cook  37 y.o.   Vaginal, Vacuum (Extractor)      Gestational Age: 39w4d  /Para:   Quantitative Blood Loss: Admission to Discharge: 300 mL (2023 10:59 AM - 2023 10:42 AM)        Dodie Cook [[id number]]      Labor Events    Rupture date/time: 2023  Rupture type: Artificial  Fluid color: Clear  Fluid odor: None  Labor type: Spontaneous Onset of Labor  Labor allowed to proceed with plans for an attempted vaginal birth?: Yes  Augmentation: AROM  Augmentation date/time: 2023  Complications: Uterine Atony  Additional complications: Vacuum extractor delivery, delivered       Labor Event Times    Labor onset date/time: 2023 1251  Dilation complete date/time: 2023 0349  Start pushing date/time: 2023 0357       Labor Length    1st stage: 14h 58m  2nd stage: 4h 14m  3rd stage: 0h 03m       Placenta    Placenta delivery date/time: 2023 0806  Placenta removal: Spontaneous  Placenta appearance: Intact  Placenta disposition: discarded       Cord    Vessels: 3 vessels  Complications: None  Delayed cord clamping?: Yes  Cord clamped date/time: 2023 0804  Cord blood disposition: Lab  Gases sent?: Yes       Lacerations    Episiotomy: None  Perineal laceration: 3a  Perineal laceration repaired?: Yes  Other lacerations?: No  Repair suture:  <50% of external anal sphincter disrupted, repaired with 3-0 PDS in PISA formation. Remainder of laceration repaired with 2-0 Vicryl. Dose of Ancef and Flagyl administered postpartum x1.     Anesthesia    Method: Epidural       Operative Delivery    Forceps attempted?: No  Vacuum extractor attempted?: Yes  Vacuum indications: Prolonged Labor  Vacuum type: Kiwi  Vacuum application location: Low  First attempt time vacuum applied: 2023 08:01:00  First attempt time vacuum removed: 2023 08:03:00  Number of pop offs: 0  Number of pulls with vacuum: 2  Failed vacuum delivery?:  No  Decision was made to proceed with vacuum-assisted vaginal delivery in order to expedite delivery due to prolonged labor, pushing 3+ hours with good maternal effort and descent.  Patient was counseled on risk of maternal trauma and higher order perineal lacerations and was also counseled on  risks of scalp laceration, cephalohematoma, and ~0.1% change of intracrancial and subgaleal hemorrhage. All questions answered and patient elected to proceed.     Shoulder Dystocia    Shoulder dystocia present?: No  ALON, compound presentation      Delivery    Birth date/time: 2023 08:03:00  Delivery type: Vaginal, Vacuum (Extractor)  Complications: Uterine Atony       Apgars    Living status: Living  Apgar Component Scores:  1 min.:  5 min.:  10 min.:  15 min.:  20 min.:    Skin color:  1  1       Heart rate:  1  2       Reflex irritability:  2  2       Muscle tone:  1  1       Respiratory effort:  1  1       Total:  6  7       Apgars assigned by: BISMARK CHAU MD       Delivery Providers    Delivering clinician: Elvia Bella MD   Provider Role    Tess Gilbert RN Delivery Nurse    Judy Osborn RN Nursery Nurse    Jennifer Le MD Resident                 Jennifer Le MD

## 2023-11-25 NOTE — CARE PLAN
Problem: Vaginal Birth or  Section  Goal: Fetal and maternal status remain reassuring during the birth process  Outcome: Progressing  Goal: Tolerate CRB for IOL placement maintenance until dislodgement/removal 12hrs after placement  Outcome: Progressing  Goal: Prevention of malpresentation/labor dystocia through delivery  Outcome: Progressing  Goal: Demonstrates labor coping techniques through delivery  Outcome: Progressing  Goal: Minimal s/sx of HDP and BP<160/110  Outcome: Progressing  Goal: No s/sx of infection through recovery  Outcome: Progressing  Goal: No s/sx of hemorrhage through recovery  Outcome: Progressing     Problem: Pain - Adult  Goal: Verbalizes/displays adequate comfort level or baseline comfort level  Outcome: Progressing     Problem: Safety - Adult  Goal: Free from fall injury  Outcome: Progressing   The patient's goals for the shift include      The clinical goals for the shift include hemodynamically stable

## 2023-11-25 NOTE — PROGRESS NOTES
Intrapartum Progress Note    Assessment/Plan   Addie Cook is a 37 y.o.  at 39w3d. ERNESTINE: 2023, by Last Menstrual Period admitted for labor.    TOLAC, Labor  - Cervical change 1--> 4 cm  - Regular ctx  - Discussed augmentation with AROM, will recheck at 9PM and augment as needed    Gita Bonilla MD   Seen and d/w Dr. Eubanks    Medical Problems       Problem List       * (Principal) Labor and delivery, indication for care    Generalized anxiety disorder    Overview Signed 10/13/2023  8:56 AM by Barak Valenzuela MD     On lexapro, related to birth trauma         S/P  section    Overview Addendum 10/30/2023  1:38 PM by Jennifer Le MD     G1 - arrest of descent, baby OP 8lb8oz  Desires TOLAC, MFMU 46.7 vs 84.8% (if including 19 wk )          Anemia during pregnancy in third trimester    Overview Addendum 2023 10:49 AM by Danielle Fabian MD     Last Hgb 10.9 (10/13), reticulocyte count 2.7%,  continue PO iron         Birth trauma    Overview Signed 10/13/2023  8:59 AM by Barak Valenzuela MD     Follows with therapist and Dr. Rollins, MDD with moderate peripartum exacerbation, HI, OCD, grief         Cervical cerclage suture present in third trimester    Overview Addendum 10/30/2023  1:40 PM by Jennifer Le MD     Cervical insufficience causing G2 loss at 19w  Harden cerclage placed 23, removed 10/30 in clinic         Multigravida of advanced maternal age in third trimester    Encounter for supervision of normal pregnancy, antepartum    Overview Addendum 2023 10:32 AM by Danielle Fabian MD     Plans for , taking birthing classes, desires epidural but would like to delay obtaining   PPBC: natural family planning  s/p Tdap, flu shot, COVID booster  Plans for    Plans for breastfeeding   GBS negative          Gastroesophageal reflux disease without esophagitis          Subjective   Doing well. Regular ctx, but mild currently.     Objective   Last  Vitals:  Temp Pulse Resp BP MAP Pulse Ox   36.5 °C (97.7 °F) 81 18 121/74   99 %     Vitals Min/Max Last 24 Hours:  Temp  Min: 36.2 °C (97.2 °F)  Max: 36.7 °C (98.1 °F)  Pulse  Min: 81  Max: 108  Resp  Min: 18  Max: 18  BP  Min: 109/56  Max: 138/77    Intake/Output:  No intake or output data in the 24 hours ending 11/24/23 1913    Physical Examination:  General: no acute distress  HEENT: normocephalic, atraumatic  Heart: warm and well perfused  Lungs: breathing comfortably on room air  Abdomen: gravid  Extremities: moving all extremities  Neuro: awake and conversant  Psych: appropriate mood and affect    Cervical Exam  Dilation: 4  Effacement (%): 80  Fetal Station: -3  Method: Manual  OB Examiner: piotr  Fetal Assessment  Movement: Present  Mode: Wireless Monitoring System  Baseline Fetal Heart Rate (bpm): 140 bpm  Baseline Classification: Normal  Variability: Moderate (Between 6 and 25 BPM)  FHR Category: Category I  Multiple Births: No     Lab Review:  Labs in chart have been reviewed

## 2023-11-25 NOTE — SIGNIFICANT EVENT
Second stage update, 3 hours:  At bedside with Dr. Eubanks to evaluate pushing. Had prior revaluated at 2.5 hours and felt to make continued steady progress. Tracing remained Cat II, variable decels with pushing with subsequent resolution to 150s, mod variability, few periods of minimal between contractions.     At 3 hours, pt pushing to 1+ station. Discussed that at 3 hours and Cat II tracing, if does not continue to make significant descent in next 30 min, may require C/S delivery. Pt expressed understanding. Will reevaluate with day team in 30 min.         11/25/2023     6:43 AM 11/25/2023     6:48 AM 11/25/2023     6:53 AM 11/25/2023     6:56 AM 11/25/2023     6:58 AM 11/25/2023     7:01 AM 11/25/2023     7:03 AM   Vitals   Systolic    131      Diastolic    74      Heart Rate 102 121 97 108 130 113 109   Temp    36.6 °C (97.9 °F)      Resp    16        Gita Bonilla MD

## 2023-11-25 NOTE — INDIVIDUALIZED OVERALL PLAN OF CARE NOTE
At bedside for exam. Unchanged from prior. AROM to copious clear fluid.    Cervical Exam  Dilation: 4  Effacement (%): 80  Fetal Station: -2  Method: Manual  OB Examiner: MD Chad  Fetal Assessment  Movement: Present  Mode: Wireless Monitoring System  Baseline Fetal Heart Rate (bpm): 135 bpm  Baseline Classification: Normal  Variability: Moderate (Between 6 and 25 BPM)  Pattern: Accelerations, Variable decelerations  Pattern Observations: broken tracing due to maternal positioning and fetal movement  FHR Category: Category II  Multiple Births: No     Tracing overall reactive and reassuring      11/24/2023     4:34 PM 11/24/2023     5:38 PM 11/24/2023     6:39 PM 11/24/2023     7:12 PM 11/24/2023     8:30 PM 11/24/2023     9:30 PM 11/24/2023    10:30 PM   Vitals   Systolic  138 109 121      Diastolic  77 56 74      Heart Rate  97 86 81      Temp 36.4 °C (97.5 °F) 36.3 °C (97.3 °F) 36.5 °C (97.7 °F) 36.4 °C (97.5 °F) 36.4 °C (97.5 °F) 36.7 °C (98.1 °F) 36.6 °C (97.9 °F)   Resp 18 18 18 18        Gita Bonilla MD

## 2023-11-26 PROCEDURE — 2500000001 HC RX 250 WO HCPCS SELF ADMINISTERED DRUGS (ALT 637 FOR MEDICARE OP): Performed by: STUDENT IN AN ORGANIZED HEALTH CARE EDUCATION/TRAINING PROGRAM

## 2023-11-26 PROCEDURE — 2500000004 HC RX 250 GENERAL PHARMACY W/ HCPCS (ALT 636 FOR OP/ED): Performed by: STUDENT IN AN ORGANIZED HEALTH CARE EDUCATION/TRAINING PROGRAM

## 2023-11-26 PROCEDURE — 1210000001 HC SEMI-PRIVATE ROOM DAILY

## 2023-11-26 RX ORDER — IBUPROFEN 600 MG/1
600 TABLET ORAL EVERY 6 HOURS SCHEDULED
Status: DISCONTINUED | OUTPATIENT
Start: 2023-11-26 | End: 2023-11-27 | Stop reason: HOSPADM

## 2023-11-26 RX ADMIN — ACETAMINOPHEN 975 MG: 325 TABLET ORAL at 07:13

## 2023-11-26 RX ADMIN — ACETAMINOPHEN 975 MG: 325 TABLET ORAL at 00:48

## 2023-11-26 RX ADMIN — ESCITALOPRAM OXALATE 30 MG: 20 TABLET ORAL at 08:57

## 2023-11-26 RX ADMIN — ACETAMINOPHEN 975 MG: 325 TABLET ORAL at 19:03

## 2023-11-26 RX ADMIN — LORATADINE 10 MG: 10 TABLET ORAL at 08:54

## 2023-11-26 RX ADMIN — ACETAMINOPHEN 975 MG: 325 TABLET ORAL at 13:11

## 2023-11-26 RX ADMIN — IBUPROFEN 600 MG: 200 SUSPENSION ORAL at 00:48

## 2023-11-26 RX ADMIN — LORATADINE 10 MG: 10 TABLET ORAL at 09:00

## 2023-11-26 ASSESSMENT — PAIN SCALES - GENERAL
PAINLEVEL_OUTOF10: 2
PAINLEVEL_OUTOF10: 0 - NO PAIN
PAINLEVEL_OUTOF10: 2
PAINLEVEL_OUTOF10: 2

## 2023-11-26 NOTE — PROGRESS NOTES
Postpartum Progress Note    Assessment/Plan   Addie Cook is a 37 y.o., , who was admitted for term IOL TOLAC, delivered at 39w4d gestation and is now postpartum day 1 s/p [facility] .     Routine postpartum care  - meeting all milestones  - 3a laceration,  mL  - bonding with female infant  - pain well controlled on po medications  - DVT Score: 5 - encourage ambulation,  SCDs, and  ppx lovenox  - O+, Rhogam not indicated  - admission hgb: 11.9  - PPBC: declines    Maternal Well-Being  - emotional support provided  - hx of PEC, normotensive and asymptomatic this admission   - past birth trauma, HI, MDD, on lexapro: reports stable mood      Feeding  - breastfeeding/pumping encouraged; lactation consult prn    Dispo:  anticipate d/c on PPD #2 if meeting all postpartum milestones, for f/u 1 month with Primary OB provider    JUDAH Monte    Principal Problem:    Labor and delivery, indication for care  Active Problems:    Gastroesophageal reflux disease without esophagitis    Pregnancy Problems (from 10/13/23 to present)       Problem Noted Resolved    Labor and delivery, indication for care 2023 by Dana Soriano, APRN-CNP No    Priority:  Medium      Anemia during pregnancy in third trimester 10/13/2023 by Barak Valenzuela MD No    Priority:  Medium      Overview Addendum 2023 10:49 AM by Danielle Fabian MD     Last Hgb 10.9 (10/13), reticulocyte count 2.7%,  continue PO iron         Cervical cerclage suture present in third trimester 10/13/2023 by Barak Valenzuela MD No    Priority:  Medium      Overview Addendum 10/30/2023  1:40 PM by Jennifer Le MD     Cervical insufficience causing G2 loss at 19w  Harden cerclage placed 23, removed 10/30 in clinic         Multigravida of advanced maternal age in third trimester 10/13/2023 by Barak Valenzuela MD No    Priority:  Medium      Encounter for supervision of normal pregnancy, antepartum  10/13/2023 by Barak Valenzuela MD No    Priority:  Medium      Overview Addendum 2023 10:32 AM by Danielle Fabian MD     Plans for , taking birthing classes, desires epidural but would like to delay obtaining   PPBC: natural family planning  s/p Tdap, flu shot, COVID booster  Plans for    Plans for breastfeeding   GBS negative                  Subjective   Her pain is well controlled with current medications  She is passing flatus  She is ambulating well  She is tolerating a Adult diet Regular  She reports no breast or nursing problems  She denies emotional concerns today      Denies HA, vision changes, RUQ pain, chest pain, or SOB.    Objective   Allergies:   Patient has no known allergies.         Last Vitals:  Temp Pulse Resp BP MAP Pulse Ox   36.4 °C (97.5 °F) 87 16 106/71   97 %     Vitals Min/Max Last 24 Hours:  Temp  Min: 36.3 °C (97.3 °F)  Max: 36.6 °C (97.9 °F)  Pulse  Min: 82  Max: 87  Resp  Min: 16  Max: 16  BP  Min: 106/71  Max: 125/78    Intake/Output:     Intake/Output Summary (Last 24 hours) at 2023 1606  Last data filed at 2023 1830  Gross per 24 hour   Intake --   Output 400 ml   Net -400 ml       Physical Exam:  General: well appearing, well-nourished, postpartum  Obstetric: abdomen soft/non-tender, fundus firm below umbilicus, lochia light  Skin: No rashes/lesions/erythema  Neuro: A/Ox3, conversational  GI: +flatus  Respiratory: Even and unlabored on RA  Extremities: No edema, discoloration, or pain in BLE, equal and palpable DP and PT pulses    Psych: appropriate mood and affect     Lab Data:  Lab Results   Component Value Date    WBC 11.8 (H) 2023    HGB 11.9 (L) 2023    HCT 37.3 2023     (L) 2023

## 2023-11-26 NOTE — LACTATION NOTE
Lactation Consultant Note  Lactation Consultation  Reason for Consult: Initial assessment  Consultant Name: Albertina Duong RN, IBCLC    Maternal Information  Has mother  before?: Yes  How long did the mother previously breastfeed?:  now 2 1/2 year old son He was in NICU for a few days, had some difficulty latching then mother saw outpatient lactation and was able to successfully breastfeed him until he was 1 1/2 years old  Previous Maternal Breastfeeding Challenges: Infant separation, Difficult latch  Exclusive Pump and Bottle Feed: No  WIC Program: No    Maternal Assessment  Breast Assessment: Medium, Soft, Warm, Compressible  Nipple Assessment: Intact, Erect  Areola Assessment: Normal    Infant Assessment  Infant Behavior: Light sleep  Infant Assessment:  (deferred at this time)    Feeding Assessment  Nutrition Source: Breastmilk  Feeding Method: Nursing at the breast  Feeding Position: Cradle, Cross - cradle, Misalignment of baby's head, trunk, and hips, Infant not tucked close and facing mother  Suck/Feeding: Sustained, Audible swallowing, Baby led rhythmically  Latch Assessment: Minimal assistance is needed, Instructed on deep latch, Areolar attachment, Deep latch obtained, Optimal angle of mouth opening, Flanged lips    LATCH TOOL  Latch: Grasps breast, tongue down, lips flanged, rhythmic sucking  Audible Swallowing: Spontaneous and intermittent (24 hours old)  Type of Nipple: Everted (After stimulation)  Comfort (Breast/Nipple): Soft/non-tender  Hold (Positioning): Minimal assist, teach one side, mother does other, staff holds  LATCH Score: 9    Breast Pump  Pump: None    Other OB Lactation Tools       Patient Follow-up       Other OB Lactation Documentation       Recommendations/Summary  Upon entering the room, infant was latched to right breast in cradle hold. I noticed infant was not tucked in close to mother with chin, shoulders and hips not aligned. Mother reports a bit of pinchy  sensation with latch. I suggested moving infant in closer to mother with chin, shoulders and hips aligned, belly of infant turned into mother's belly and infant's left arm tucked under right breast. Infant unlatched from breast in the process of repositioning infant, mother's nipple creased upon infant unlatching. Mother states infant was latched to right breast for about 15 minutes already. I suggested trying to burp infant and then mother placed infant in cradle hold at left breast as infant rooting and began showing more feeding cues. Mother able to latch infant in cradle hold independently however not deeply with areolar attachment. I noticed infant making a smacking noise with sucks. I suggested unlatching infant and latching infant in cross-cradle hold first then transitioning to cradle hold when deep latch achieved. We discussed goal of getting infant's chin and lower lip to contact the breast first and aiming nipple to roof of mouth. In cross-cradle hold, infant latched well with areolar attachment, nose and chin touching breast, lips flanged, sucks with long jaw movements and audible swallows noted. Mother transitioned to cradle hold and states this latch felt much more comfortable. I pointed out what swallowing looks/sounds like as well as reviewed signs of satiety. I also reviewed with mother goal of feeding infant based on feeding cues approximately 8-12 times in a 24 hour period, waking infant to feed if it has been 3 hours since last feed, and allowing infant to feed until infant unlatches or until tactile stimulation is not keeping infant sucking and swallowing at breast. Mother has a breast pump for home use. Outpatient lactation resources discussed with mother. I encouraged mother to call for any questions, concerns or assistance.

## 2023-11-26 NOTE — ANESTHESIA POSTPROCEDURE EVALUATION
Addie Cook is a 37 y.o., , who had a Vaginal, Vacuum (Extractor)  delivery on 2023  at 39w4d and is now POD1.    She had Neuraxial Anesthesia without immediate complications noted.       Pain well controlled    Vitals:    23 0401   BP: 117/80   Pulse: 87   Resp: 16   Temp: 36.3 °C (97.3 °F)   SpO2: 98%       Neuraxial site assessed. No visible redness or swelling or drainage. Patient able to ambulate and move all extremities without difficulty. Able to void. No complaints of nausea/vomiting. Tolerating PO intake well. No s/sx of PDPH.     Anesthesia will sign off     Aliyah Newman MD

## 2023-11-27 ENCOUNTER — PHARMACY VISIT (OUTPATIENT)
Dept: PHARMACY | Facility: CLINIC | Age: 38
End: 2023-11-27

## 2023-11-27 VITALS
DIASTOLIC BLOOD PRESSURE: 89 MMHG | BODY MASS INDEX: 30.98 KG/M2 | HEART RATE: 69 BPM | SYSTOLIC BLOOD PRESSURE: 134 MMHG | RESPIRATION RATE: 16 BRPM | OXYGEN SATURATION: 99 % | TEMPERATURE: 97.2 F | WEIGHT: 174.82 LBS | HEIGHT: 63 IN

## 2023-11-27 LAB
BLOOD EXPIRATION DATE: NORMAL
DISPENSE STATUS: NORMAL
PRODUCT BLOOD TYPE: 5100
PRODUCT CODE: NORMAL
UNIT ABO: NORMAL
UNIT NUMBER: NORMAL
UNIT RH: NORMAL
UNIT VOLUME: 350
XM INTEP: NORMAL

## 2023-11-27 PROCEDURE — RXMED WILLOW AMBULATORY MEDICATION CHARGE

## 2023-11-27 RX ORDER — POLYETHYLENE GLYCOL 3350 17 G/17G
17 POWDER, FOR SOLUTION ORAL 2 TIMES DAILY
Qty: 1020 G | Refills: 0 | Status: SHIPPED | OUTPATIENT
Start: 2023-11-27 | End: 2024-01-04

## 2023-11-27 RX ADMIN — ACETAMINOPHEN 975 MG: 325 TABLET ORAL at 04:19

## 2023-11-27 ASSESSMENT — PAIN - FUNCTIONAL ASSESSMENT: PAIN_FUNCTIONAL_ASSESSMENT: 0-10

## 2023-11-27 ASSESSMENT — PAIN DESCRIPTION - LOCATION: LOCATION: OTHER (COMMENT)

## 2023-11-27 ASSESSMENT — PAIN SCALES - GENERAL
PAINLEVEL_OUTOF10: 2
PAINLEVEL_OUTOF10: 0 - NO PAIN

## 2023-11-27 NOTE — CARE PLAN
VSS, pain well controlled,Fundus firm, bleeding scant, breast feeding well, zero signs acute distress. Cleared for discharge

## 2023-11-27 NOTE — DISCHARGE SUMMARY
"Discharge Summary    Admission Date: 2023  Discharge Date: 23  Discharge Diagnosis: Labor and delivery, indication for care ; 3rd degree perineal lac    Patient Active Problem List   Diagnosis    Generalized anxiety disorder    S/P  section    Anemia during pregnancy in third trimester    Birth trauma    Cervical cerclage suture present in third trimester    Multigravida of advanced maternal age in third trimester    Encounter for supervision of normal pregnancy, antepartum    Labor and delivery, indication for care    Gastroesophageal reflux disease without esophagitis       Hospital Course  Addie Cook is a 37 y.o.,     Initially presented for: ROM    Admission Date: 2023    Delivery Date: 2023  8:03 AM     Delivery type: Vaginal, Vacuum (Extractor)  ;     GA at delivery: 39w4d    Outcome: Living     Anesthesia during delivery: Epidural     Intrapartum complications: Uterine Atony  3a perineal tear;     Feeding method: Breastfeeding Status: Yes    Contraception: Defers contraception to primary OB/PP visit. We discussed pregnancy spacing of at least one year, abstaining from intercourse for 6wks, and the ability to become pregnant in the absence of regular menses. Pt verbalized understanding.     Rhogam: The patient's blood type is O POS. The baby's blood type is O POS . Rhogam is not indicated.     Now postpartum day: 2.    Hospital course n/f:   ml, s/p Pit and rectal cytotec  3a perineal tear; s/p Ancef + Flaygl; ERAD clinic referral placed. Pt had BM this morning with no difficulty. Stated it was \"uncomfortable but not terrible.\" Miralax BID rx'd @ DC.    PP course otherwise uneventful.  Meeting all postpartum milestones- ambulating independently, passing flatus, tolerating PO intake, lochia light, voiding spontaneously, and pain well controlled with PO meds.     Dispo  OK for DC today  F/up 2-4wks ERAD clinic and 6 weeks with prenatal provider.     Pertinent " Physical Exam At Time of Discharge  General: well appearing, well nourished, postpartum  Obstetric: fundus firm below umbilicus, lochia light  Skin: Warm, dry; no rashes/lesions/erythema  Breast: No masses, nipple discharge  Neuro: A/Ox3, conversational, no gross motor deficit   GI: no distension, appropriately tender, soft, +BS  Perineum: repair well approximated, no erythema/discharge  Respiratory: Even and unlabored on RA  Cardiovascular: No BLE edema; No erythema, warmth  Psych: appropriate mood and affect       Your medication list        START taking these medications        Instructions Last Dose Given Next Dose Due   polyethylene glycol 17 gram/dose powder  Commonly known as: Glycolax, Miralax      Take 17 g (mix 1 capful) by mouth 2 times a day. Hold for diarrhea.              CONTINUE taking these medications        Instructions Last Dose Given Next Dose Due   cetirizine 5 mg tablet  Commonly known as: ZyrTEC           escitalopram 20 mg tablet  Commonly known as: Lexapro      Take 1.5 tablets (30 mg) by mouth once daily.       FeroSuL tablet  Generic drug: ferrous sulfate (325 mg ferrous sulfate)      TAKE 1 TABLET BY MOUTH ONCE DAILY AS DIRECTED       Prenatal Vitamin 27 mg iron- 800 mcg tablet  Generic drug: prenatal vit no.124-iron-folic                     Where to Get Your Medications        These medications were sent to [facility]  [address] [place] [address]      Hours: 8:30 AM to 5 PM Mon-Fri Phone: [phone]   polyethylene glycol 17 gram/dose powder           Outpatient Follow-Up  Future Appointments   Date Time Provider Department Center   11/27/2023  2:00 PM [facility]2 L&D PROCEDURE ROOM 1 [address]   12/14/2023  2:00 PM Nikos Rollins MD [id number] Academic       Taylor Agustin, APRN-CNP

## 2023-11-27 NOTE — LACTATION NOTE
Lactation Consultant Note  Lactation Consultation  Reason for Consult: Follow-up assessment  Consultant Name: DAVIDSON Greenfield IBCLC    Maternal Information  Infant to breast within first 2 hours of birth?: Yes  Exclusive Pump and Bottle Feed: No    Maternal Assessment  Breast Assessment: Medium, Symmetrical, Soft, Compressible  Nipple Assessment: Intact, Erect, Rounded after feeding  Areola Assessment: Normal    Infant Assessment  Infant Behavior: Quiet alert, Sucking  Infant Assessment: Other (Comment) (deferred)    Feeding Assessment  Nutrition Source: Breastmilk  Feeding Method: Nursing at the breast  Feeding Position: Cradle, Skin to skin, Both sides, Nipple to nose, Infant not tucked close and facing mother, Mother demonstrates good positioning, Nose lightly touching breast, Chin tucked into breast  Suck/Feeding: Sustained, Coordinated suck/swallow/breathe, Baby led rhythmically  Latch Assessment: Areolar attachment, Deep latch obtained, Optimal angle of mouth opening, Comfortable with no pain, Sucking and swallowing, Sucks with long jaw movement, Chin and lower lip contact breast first, Bursts of sucking, swallowing, and rest, Flanged lips, Chin moves in rhythmic motion, Comfortable latch    LATCH TOOL  Latch: Grasps breast, tongue down, lips flanged, rhythmic sucking  Audible Swallowing: Spontaneous and intermittent (24 hours old)  Type of Nipple: Everted (After stimulation)  Comfort (Breast/Nipple): Soft/non-tender  Hold (Positioning): No assist from staff, mother able to position/hold infant  LATCH Score: 10    Breast Pump       Other OB Lactation Tools  Lactation Tools: Other (Comment) (expressed colostrum)    Patient Follow-up  Inpatient Lactation Follow-up Needed : No  Lactation Professional - OK to Discharge: Yes    Other OB Lactation Documentation       Recommendations/Summary    This mother was already breastfeeding when I came into the room. She had independently latched her infant to the left breast.  The infant was in a cradle hold, well-latched with good rhythmic sucking and swallowing; the mother reported the latch as comfortable. The mother had a few questions about use of lanolin for nipple tenderness and requested a demonstration of how to express colostrum. We discussed expected milk volume increases over the next few days and minimum feeds and urine/stool outputs for determining feeding adequacy. She has a breast pump for home use and contact information for outpatient lactation services. She is encouraged to call for assistance as needed.

## 2023-11-28 ENCOUNTER — DOCUMENTATION (OUTPATIENT)
Dept: CASE MANAGEMENT | Facility: HOSPITAL | Age: 38
End: 2023-11-28
Payer: COMMERCIAL

## 2023-11-28 NOTE — SIGNIFICANT EVENT
Follow-up Phone Call Note:   Interview:  Care Type: Women's Health   Phone Number Call  [phone]   Call Outcome: Left Message             Date/Time Of Call: 11/28/23 at 1143   Call Back Done By: care coordinator   Callback Complete:  Yes

## 2023-11-28 NOTE — PROGRESS NOTES
23: Pt known to [facility] from previous loss at 19.6 wga on 22. Addie is a 38 yo now  as she delivered a healthy baby girl (Shila) on 23. Met with Addie and her  today at bedside. Addie shared that it's been very healing to have Shila here given the difficulty they've endured over the past year. Provided support.    Will remain available for bereavement support and resources in the future prn.     Mallory Kirk, MSSA, LISW, CGP  [facility] Director of [facility]  [phone]

## 2023-12-13 ENCOUNTER — APPOINTMENT (OUTPATIENT)
Dept: BEHAVIORAL HEALTH | Facility: CLINIC | Age: 38
End: 2023-12-13
Payer: COMMERCIAL

## 2023-12-19 PROCEDURE — RXMED WILLOW AMBULATORY MEDICATION CHARGE

## 2023-12-20 ENCOUNTER — PHARMACY VISIT (OUTPATIENT)
Dept: PHARMACY | Facility: CLINIC | Age: 38
End: 2023-12-20
Payer: COMMERCIAL

## 2023-12-28 ENCOUNTER — OFFICE VISIT (OUTPATIENT)
Dept: OBSTETRICS AND GYNECOLOGY | Facility: CLINIC | Age: 38
End: 2023-12-28
Payer: COMMERCIAL

## 2023-12-28 VITALS
HEIGHT: 64 IN | WEIGHT: 157.7 LBS | DIASTOLIC BLOOD PRESSURE: 86 MMHG | HEART RATE: 76 BPM | SYSTOLIC BLOOD PRESSURE: 127 MMHG | BODY MASS INDEX: 26.92 KG/M2

## 2023-12-28 DIAGNOSIS — Z12.4 SCREENING FOR MALIGNANT NEOPLASM OF CERVIX: Primary | ICD-10-CM

## 2023-12-28 PROCEDURE — 99213 OFFICE O/P EST LOW 20 MIN: CPT | Performed by: OBSTETRICS & GYNECOLOGY

## 2023-12-28 PROCEDURE — 87624 HPV HI-RISK TYP POOLED RSLT: CPT | Performed by: OBSTETRICS & GYNECOLOGY

## 2023-12-28 PROCEDURE — 88142 CYTOPATH C/V THIN LAYER: CPT | Mod: TC,GCY | Performed by: OBSTETRICS & GYNECOLOGY

## 2023-12-28 PROCEDURE — 1036F TOBACCO NON-USER: CPT | Performed by: OBSTETRICS & GYNECOLOGY

## 2023-12-28 ASSESSMENT — PAIN SCALES - GENERAL: PAINLEVEL: 0-NO PAIN

## 2023-12-28 NOTE — PROGRESS NOTES
Postpartum Progress Note    Assessment/Plan   Addie Cook is a 38 y.o., , who delivered at 39w4d gestation and is now postpartum day 33.    Pt had a third degree laceration    Active Problems:  There are no active Hospital Problems.    Pregnancy Problems (from 10/13/23 to present)       Problem Noted Resolved    Labor and delivery, indication for care 2023 by Dana Soriano, APRN-CNP No    Anemia during pregnancy in third trimester 10/13/2023 by Barak Valenzuela MD No    Overview Addendum 2023 10:49 AM by Danielle Fabian MD     Last Hgb 10.9 (10/13), reticulocyte count 2.7%,  continue PO iron         Cervical cerclage suture present in third trimester 10/13/2023 by Barak Valenzuela MD No    Overview Addendum 10/30/2023  1:40 PM by Jennifer Le MD     Cervical insufficience causing G2 loss at 19w  Harden cerclage placed 23, removed 10/30 in clinic         Multigravida of advanced maternal age in third trimester 10/13/2023 by Barak Valenzuela MD No    Encounter for supervision of normal pregnancy, antepartum 10/13/2023 by Barak Valenzuela MD No    Overview Addendum 2023 10:32 AM by Danielle Fabian MD     Plans for , taking birthing classes, desires epidural but would like to delay obtaining   PPBC: natural family planning  s/p Tdap, flu shot, COVID booster  Plans for    Plans for breastfeeding   GBS negative                Hospital course: no complications       Subjective   Her pain is well controlled with current medications  She is passing flatus  She is ambulating well  She is tolerating a No diet orders on file  She reports no breast or nursing problems  She denies emotional concerns today   Her plan for contraception is periodic abstinence     Pt is doing well    Objective   Allergies:   Patient has no known allergies.         Last Vitals:  Temp Pulse Resp BP MAP Pulse Ox     76   127/86         Vitals Min/Max Last 24  Hours:  @FLOWSTAT(6,8,9,5,[id number]:24::1)@    Intake/Output:   [unfilled]    Physical Exam:  General: Examination reveals a well developed, well nourished and overweight, female, in no acute distress. She is alert and cooperative.    Lab Data:  Labs in chart were reviewed.  Pt has history of LGSIL  Pap repeated today

## 2024-01-05 PROCEDURE — RXMED WILLOW AMBULATORY MEDICATION CHARGE

## 2024-01-08 ENCOUNTER — PHARMACY VISIT (OUTPATIENT)
Dept: PHARMACY | Facility: CLINIC | Age: 39
End: 2024-01-08
Payer: COMMERCIAL

## 2024-01-10 PROCEDURE — RXMED WILLOW AMBULATORY MEDICATION CHARGE

## 2024-01-12 ENCOUNTER — PHARMACY VISIT (OUTPATIENT)
Dept: PHARMACY | Facility: CLINIC | Age: 39
End: 2024-01-12
Payer: COMMERCIAL

## 2024-01-18 ENCOUNTER — OFFICE VISIT (OUTPATIENT)
Dept: BEHAVIORAL HEALTH | Facility: CLINIC | Age: 39
End: 2024-01-18
Payer: COMMERCIAL

## 2024-01-18 DIAGNOSIS — Z79.899 MEDICATION MANAGEMENT: ICD-10-CM

## 2024-01-18 DIAGNOSIS — F33.1 MAJOR DEPRESSIVE DISORDER, RECURRENT EPISODE, MODERATE (MULTI): ICD-10-CM

## 2024-01-18 DIAGNOSIS — F41.1 GENERALIZED ANXIETY DISORDER: Primary | ICD-10-CM

## 2024-01-18 LAB

## 2024-01-18 PROCEDURE — 1036F TOBACCO NON-USER: CPT | Performed by: STUDENT IN AN ORGANIZED HEALTH CARE EDUCATION/TRAINING PROGRAM

## 2024-01-18 PROCEDURE — 99213 OFFICE O/P EST LOW 20 MIN: CPT | Mod: AM,95 | Performed by: STUDENT IN AN ORGANIZED HEALTH CARE EDUCATION/TRAINING PROGRAM

## 2024-01-18 PROCEDURE — 99213 OFFICE O/P EST LOW 20 MIN: CPT | Performed by: STUDENT IN AN ORGANIZED HEALTH CARE EDUCATION/TRAINING PROGRAM

## 2024-01-18 RX ORDER — ESCITALOPRAM OXALATE 20 MG/1
30 TABLET ORAL DAILY
Qty: 90 TABLET | Refills: 3 | Status: SHIPPED | OUTPATIENT
Start: 2024-01-18

## 2024-01-18 NOTE — PROGRESS NOTES
Subjective    ID: Addie Cook is a 38 y.o. female  s/p delivery 2023, with history of MDD, HI, OCD, and trauma presents for a follow up. (Rosalie name is Shila).    Reports that she is doing well overall, but is getting more stressful as  jreturned to work and mother in law has left. Reports that toddler has been acting out at times. Reports that she had a several days of hopeless feeling, but was able to communicate her need to . Reports that she has been able to care for the  since she has had experience from the first pregnancy. She is getting some sleep, but getting up every 3-4 hrs to feed, but believes that it will improve. Reports appetite as good. Reports that energy is poor due to childcare (toddler acting out). Reports brain fog, but believes that it will improve with sleep. Reports that she franko by writing down things in a planner. Denies any SE from Lexapro. Endorses compliance with medication. Reports that she tries to break up the day and getting some break when the  visits once a week. Breastfeeding the .     Reports that she just started EMDR through an indepdendent counselor and had two sessions, and that she really enjoys the process.     More stressed with work and looking forward to having some time away.    Denies SI, HI, AVH.     Medication side effects: None Reported     Review of Systems  Constitutional: fatigue  Psychiatric: Positive for anxiety, Negative for depression, irritability, loss of interest in favorite activities, mood swings, and sleep disturbance  Neurological: tension headache    Objective   LMP 2023 (Exact Date)   Wt Readings from Last 4 Encounters:   23 71.5 kg (157 lb 11.2 oz)   23 79.3 kg (174 lb 13.2 oz)   23 79.1 kg (174 lb 6.4 oz)   23 78.6 kg (173 lb 4.8 oz)       Mental Status Exam  General Appearance: Well groomed, appropriate eye contact.  Attitude/Behavior: Cooperative, conversant,  engaged, and with good eye contact.  Motor: No psychomotor agitation or retardation, no tremor or other abnormal movements.  Speech: Normal rate, volume, prosody  Gait/Station: Within normal limits  Mood: OK and well  Affect: Anxious and Congruent with mood and topic of conversation  Thought Process: Linear, goal directed  Thought Associations: No loosening of associations  Thought Content: normal  Sensorium: Alert and oriented to person, place, time and situation  Insight: Intact, as evidenced by awareness of symptom triggers  Judgment: Intact  Cognition: Cognitively intact to conversational testing with respect to attention, orientation, fund of knowledge, recent and remote memory, and language.  Testing: N/A.    Laboratory/Imaging/Diagnostic Tests   No recent test    Assessment/Plan   Overall Formulation and Differential Diagnosis:  Addie Cook is a 38 y.o. female who meets criteria for MDD, HI, OCD, and trauma-related disorder.  Interval Assessment:   female s/p delivery 2024 with reported psychiatric history of depression and HI with panic (treated with escitalopram 20 mg daily since 2018) who presents for follow up. Overwhelmed with childcare of  and toddler, but albe to communicate need with partner. Started therapy. Mood is overall stable and tolerating Lexapro well. Given she is breastfeeding, will continue Lexapro at current dose given her progesterone level is still not back to antepartum level.      Impression:  MDD moderate with peripartum exacerbation  HI  OCD  Grief following fetal loss  Rule out trauma and stressor-related disorder     PLAN:  - continue escitalopram 30 mg PO daily  - continue individual psychotherapy (EMDR with outside therapist)  - Patient understands in case of medical/psychiatric emergency to call 911 or go to nearest ER  - Follow up in 4 weeks    Risk Assessment:  Imminent Risk of Suicide or Serious Self-Injury: Low Risk -- Risk factors include: History of  trauma or abuse  Protective factors include:Denies current suicidal ideation, Denies history of suicide attempts , Future-oriented talk , Willingness to seek help and support , Skills in problem solving, conflict resolution, and nonviolent handling of disputes, Cultural and Denominational beliefs that discourage suicide and support self-preservation , Access to a variety of clinical interventions , Receiving and engaged in care for mental, physical, and substance use disorders , History of adhering to treatment recommendations and/or prescribed medication regimen , Support through ongoing medical and mental healthcare relationships , Current/history of good response to treatment/meds , Interpersonal relationships and supports, e.g., family, friends, peers, community , and Restricted access to firearms or other lethal means of suicide   Imminent Risk of Violence or Homicide: Low Risk - Risk factors include: No significant risk factors identified on screening. Protective factors include: Lack of known history of harm to others , Lack of known history of violent ideation , Lack of known access to firearms , Sense of community, availability/access to resources and support , Sense of optimism, hope , Interpersonal competence , Affect regulation , Sense of self-efficacy, internal locus of control , and Positive, pro-social family/peer network   Treatment Plan:  There are no recently modified care plans to display for this patient.      Attestation Statements   Number of Minutes Spent Performing Evaluation & Management (E&M): 30 mns

## 2024-01-18 NOTE — PATIENT INSTRUCTIONS
- continue escitalopram 30 mg oral daily  - continue individual psychotherapy (EMDR with outside therapist)  - Patient understands in case of medical/psychiatric emergency to call 911 or go to nearest ER  - Follow up in 4 weeks

## 2024-01-19 PROCEDURE — RXMED WILLOW AMBULATORY MEDICATION CHARGE

## 2024-01-20 ENCOUNTER — PHARMACY VISIT (OUTPATIENT)
Dept: PHARMACY | Facility: CLINIC | Age: 39
End: 2024-01-20
Payer: COMMERCIAL

## 2024-02-16 ENCOUNTER — PHARMACY VISIT (OUTPATIENT)
Dept: PHARMACY | Facility: CLINIC | Age: 39
End: 2024-02-16
Payer: COMMERCIAL

## 2024-02-16 PROCEDURE — RXMED WILLOW AMBULATORY MEDICATION CHARGE

## 2024-04-17 ENCOUNTER — OFFICE VISIT (OUTPATIENT)
Dept: BEHAVIORAL HEALTH | Facility: CLINIC | Age: 39
End: 2024-04-17
Payer: COMMERCIAL

## 2024-04-17 DIAGNOSIS — F33.1 MAJOR DEPRESSIVE DISORDER, RECURRENT EPISODE, MODERATE (MULTI): ICD-10-CM

## 2024-04-17 DIAGNOSIS — F41.1 GENERALIZED ANXIETY DISORDER: ICD-10-CM

## 2024-04-17 PROCEDURE — 99214 OFFICE O/P EST MOD 30 MIN: CPT | Mod: AM,95 | Performed by: STUDENT IN AN ORGANIZED HEALTH CARE EDUCATION/TRAINING PROGRAM

## 2024-04-17 PROCEDURE — 99214 OFFICE O/P EST MOD 30 MIN: CPT | Performed by: STUDENT IN AN ORGANIZED HEALTH CARE EDUCATION/TRAINING PROGRAM

## 2024-04-17 NOTE — PROGRESS NOTES
Subjective    ID: Addie Cook is a 38 y.o. female  s/p delivery [birth year]23, with history of MDD, HI, OCD, and trauma presents for a follow up. ([place] name is Shila).    Today she reports having several good days where she feels as if she's a good mother though continues to have some tough days, in particular the days she has EMDR. She sees her therapist 2 weeks on and 1 week off. She feels as though she is getting benefit from EMDR in particular with recognizing her core beliefs/cognitive distortions and discussed trying to understand that there are things are are both in and our of her control. Since last seen she states her  was diagnosed with Artemio's. They're move date is set for 2024 to [place] where they have found a house. She is looking forward to being near her 's family. Enjoying her daughter. She endorses feeling as if her mood is under control with current medication regimen. Denies SI, HI, AVH.     Medication side effects: None Reported     Review of Systems  Constitutional: fatigue  Psychiatric: Positive for anxiety, Negative for depression, irritability, loss of interest in favorite activities, mood swings, and sleep disturbance  Neurological: tension headache    Objective   There were no vitals taken for this visit.  Wt Readings from Last 4 Encounters:   23 71.5 kg (157 lb 11.2 oz)   23 79.3 kg (174 lb 13.2 oz)   23 79.1 kg (174 lb 6.4 oz)   23 78.6 kg (173 lb 4.8 oz)       Mental Status Exam  General Appearance: Well groomed, appropriate eye contact.  Attitude/Behavior: Cooperative, conversant, engaged, and with good eye contact.  Motor: No psychomotor agitation or retardation, no tremor or other abnormal movements.  Speech: Normal rate, volume, prosody  Gait/Station: Within normal limits  Mood: OK and well  Affect: Anxious and Congruent with mood and topic of conversation  Thought Process: Linear, goal directed  Thought Associations: No loosening  of associations  Thought Content: normal  Sensorium: Alert and oriented to person, place, time and situation  Insight: Intact, as evidenced by awareness of symptom triggers  Judgment: Intact  Cognition: Cognitively intact to conversational testing with respect to attention, orientation, fund of knowledge, recent and remote memory, and language.  Testing: N/A.    Laboratory/Imaging/Diagnostic Tests   No recent test    Assessment/Plan   Overall Formulation and Differential Diagnosis:  Addie Cook is a 38 y.o. female who meets criteria for MDD, HI, OCD, and trauma-related disorder.  Interval Assessment:   female s/p delivery 2024 with reported psychiatric history of depression and HI with panic (treated with escitalopram 20 mg daily since 2018) who presents for follow up.     Mood is overall stable and tolerating Lexapro well. Finding benefit from EMDR therapy. Given she is breastfeeding, will continue Lexapro at current dose given her progesterone level is still not back to antepartum level.      Impression:  MDD moderate with peripartum exacerbation  HI  OCD  Grief following fetal loss  Rule out trauma and stressor-related disorder     PLAN:  - continue escitalopram 30 mg PO daily  - continue individual psychotherapy (EMDR with outside therapist)  - Patient understands in case of medical/psychiatric emergency to call 911 or go to nearest ER  - Follow up in 4 weeks    Risk Assessment:  Imminent Risk of Suicide or Serious Self-Injury: Low Risk -- Risk factors include: History of trauma or abuse  Protective factors include:Denies current suicidal ideation, Denies history of suicide attempts , Future-oriented talk , Willingness to seek help and support , Skills in problem solving, conflict resolution, and nonviolent handling of disputes, Cultural and Jainism beliefs that discourage suicide and support self-preservation , Access to a variety of clinical interventions , Receiving and engaged in care for  mental, physical, and substance use disorders , History of adhering to treatment recommendations and/or prescribed medication regimen , Support through ongoing medical and mental healthcare relationships , Current/history of good response to treatment/meds , Interpersonal relationships and supports, e.g., family, friends, peers, community , and Restricted access to firearms or other lethal means of suicide   Imminent Risk of Violence or Homicide: Low Risk - Risk factors include: No significant risk factors identified on screening. Protective factors include: Lack of known history of harm to others , Lack of known history of violent ideation , Lack of known access to firearms , Sense of community, availability/access to resources and support , Sense of optimism, hope , Interpersonal competence , Affect regulation , Sense of self-efficacy, internal locus of control , and Positive, pro-social family/peer network   Treatment Plan:  There are no recently modified care plans to display for this patient.      Attestation Statements   Number of Minutes Spent Performing Evaluation & Management (E&M): 30 mns

## 2024-04-18 NOTE — PROGRESS NOTES
I saw and evaluated the patient. I personally obtained the key and critical portions of the history and physical exam or was physically present for key and critical portions performed by the resident/fellow. I reviewed the resident/fellow's documentation and discussed the patient with the resident/fellow. I agree with the resident/fellow's medical decision making as documented in the note.    Nikos Rollins MD

## 2024-06-07 ENCOUNTER — PHARMACY VISIT (OUTPATIENT)
Dept: PHARMACY | Facility: CLINIC | Age: 39
End: 2024-06-07
Payer: COMMERCIAL

## 2024-06-07 PROCEDURE — RXMED WILLOW AMBULATORY MEDICATION CHARGE

## 2024-06-27 ENCOUNTER — OFFICE VISIT (OUTPATIENT)
Dept: BEHAVIORAL HEALTH | Facility: CLINIC | Age: 39
End: 2024-06-27
Payer: COMMERCIAL

## 2024-06-27 DIAGNOSIS — F42.2 MIXED OBSESSIONAL THOUGHTS AND ACTS: ICD-10-CM

## 2024-06-27 DIAGNOSIS — F41.1 GENERALIZED ANXIETY DISORDER: ICD-10-CM

## 2024-06-27 DIAGNOSIS — F33.1 MAJOR DEPRESSIVE DISORDER, RECURRENT EPISODE, MODERATE (MULTI): ICD-10-CM

## 2024-06-27 PROCEDURE — 99214 OFFICE O/P EST MOD 30 MIN: CPT | Performed by: STUDENT IN AN ORGANIZED HEALTH CARE EDUCATION/TRAINING PROGRAM

## 2024-06-27 PROCEDURE — 99214 OFFICE O/P EST MOD 30 MIN: CPT | Mod: GT,AM,95 | Performed by: STUDENT IN AN ORGANIZED HEALTH CARE EDUCATION/TRAINING PROGRAM

## 2024-06-27 NOTE — PROGRESS NOTES
Subjective    ID: Addie Cook is a 38 y.o. female  s/p delivery [birth year]23, with history of MDD, HI, OCD, and trauma presents for a follow up. ([place] name is Shila). Still breastfeeding.    Move went well. Still waiting to see what things are like when  starts new job next week. Already meeting new people, feeling hopeful. Anxiety has been calm.    Still likes higher dose of escitalopram.    Daughter sleeping in own room now, sleep still not optimal.    Denies SI, HI, AVH.     Medication side effects: None Reported     Review of Systems  Constitutional: fatigue  Psychiatric: Positive for anxiety, Negative for depression, irritability, loss of interest in favorite activities, mood swings, and sleep disturbance  Neurological: none currently    Objective   There were no vitals taken for this visit.  Wt Readings from Last 4 Encounters:   23 71.5 kg (157 lb 11.2 oz)   23 79.3 kg (174 lb 13.2 oz)   23 79.1 kg (174 lb 6.4 oz)   23 78.6 kg (173 lb 4.8 oz)       Mental Status Exam  General Appearance: Well groomed, appropriate eye contact.  Attitude/Behavior: Cooperative, conversant, engaged, and with good eye contact.  Motor: No psychomotor agitation or retardation, no tremor or other abnormal movements.  Speech: Normal rate, volume, prosody  Gait/Station: Within normal limits  Mood: euthymic  Affect: Euthymic, full-range and Congruent with mood and topic of conversation  Thought Process: Linear, goal directed  Thought Associations: No loosening of associations  Thought Content: normal  Sensorium: Alert and oriented to person, place, time and situation  Insight: Intact, as evidenced by awareness of symptom triggers  Judgment: Intact  Cognition: Cognitively intact to conversational testing with respect to attention, orientation, fund of knowledge, recent and remote memory, and language.  Testing: N/A.    Laboratory/Imaging/Diagnostic Tests   No recent test    Assessment/Plan   Overall  Formulation and Differential Diagnosis:  Addie Cook is a 38 y.o. female who meets criteria for MDD, HI, OCD, and trauma-related disorder.  Interval Assessment:   female s/p delivery [birth year]24 with reported psychiatric history of depression and HI with panic (treated with escitalopram 20 mg daily since 2018) who presents for follow up.     Mood is overall stable and tolerating Lexapro 30 mg well. Breastfeeding.     Impression:  MDD moderate with peripartum exacerbation  HI  OCD  Grief following fetal loss  Rule out trauma and stressor-related disorder     PLAN:  - continue escitalopram 30 mg PO daily  - continue individual psychotherapy  - Patient understands in case of medical/psychiatric emergency to call 911 or go to nearest ER  - Follow up in 10-12 weeks    Risk Assessment:  Imminent Risk of Suicide or Serious Self-Injury: Low Risk -- Risk factors include: History of trauma or abuse  Protective factors include:Denies current suicidal ideation, Denies history of suicide attempts , Future-oriented talk , Willingness to seek help and support , Skills in problem solving, conflict resolution, and nonviolent handling of disputes, Cultural and Latter-day beliefs that discourage suicide and support self-preservation , Access to a variety of clinical interventions , Receiving and engaged in care for mental, physical, and substance use disorders , History of adhering to treatment recommendations and/or prescribed medication regimen , Support through ongoing medical and mental healthcare relationships , Current/history of good response to treatment/meds , Interpersonal relationships and supports, e.g., family, friends, peers, community , and Restricted access to firearms or other lethal means of suicide   Imminent Risk of Violence or Homicide: Low Risk - Risk factors include: No significant risk factors identified on screening. Protective factors include: Lack of known history of harm to others , Lack of known  history of violent ideation , Lack of known access to firearms , Sense of community, availability/access to resources and support , Sense of optimism, hope , Interpersonal competence , Affect regulation , Sense of self-efficacy, internal locus of control , and Positive, pro-social family/peer network   Treatment Plan:  There are no recently modified care plans to display for this patient.      Attestation Statements   Number of Minutes Spent Performing Evaluation & Management (E&M): 30 mns

## 2024-09-12 ENCOUNTER — TELEMEDICINE (OUTPATIENT)
Dept: BEHAVIORAL HEALTH | Facility: CLINIC | Age: 39
End: 2024-09-12
Payer: COMMERCIAL

## 2024-09-12 DIAGNOSIS — F41.1 GENERALIZED ANXIETY DISORDER: ICD-10-CM

## 2024-09-12 DIAGNOSIS — Z79.899 MEDICATION MANAGEMENT: ICD-10-CM

## 2024-09-12 PROCEDURE — 99214 OFFICE O/P EST MOD 30 MIN: CPT | Performed by: STUDENT IN AN ORGANIZED HEALTH CARE EDUCATION/TRAINING PROGRAM

## 2024-09-12 RX ORDER — ESCITALOPRAM OXALATE 20 MG/1
30 TABLET ORAL DAILY
Qty: 90 TABLET | Refills: 3 | Status: SHIPPED | OUTPATIENT
Start: 2024-09-12

## 2024-09-12 NOTE — PROGRESS NOTES
Subjective    ID: Addie Cook is a 38 y.o. female  s/p delivery, with history of MDD, HI, OCD, and trauma presents for a follow up. 9 month old Shila). Still breastfeeding.    Sometimes feels overwhelmed but it does not come from anxiety. It's from feeling like she does not have enough energy to do what she needs  to do. 3 year old is having difficulty falling asleep due to naps. She endorses a fairly consisted sleep routine around her 9 month old who she is breastfeed. Sleeps within 30 minutes from 9pm to 5:30. Endorses good mood and is socializing more. She has established a friend group of moms and is finding people she wants to hang out with. She is snacking more but also took up exercise. Is starting to feel fatigued which she tries to combat with caffeine which does not help. Noted recent stressors including kids being sick and having to go to a  for her 's friend.     Denies SI, HI, AVH.     Medication side effects: None Reported     Review of Systems  Constitutional: fatigue  Psychiatric: Positive for anxiety, Negative for depression, irritability, loss of interest in favorite activities, mood swings, and sleep disturbance  Neurological: none currently    Objective   There were no vitals taken for this visit.  Wt Readings from Last 4 Encounters:   23 71.5 kg (157 lb 11.2 oz)   23 79.3 kg (174 lb 13.2 oz)   23 79.1 kg (174 lb 6.4 oz)   23 78.6 kg (173 lb 4.8 oz)       Mental Status Exam  General Appearance: Well groomed, appropriate eye contact.  Attitude/Behavior: Cooperative, conversant, engaged, and with good eye contact.  Motor: No psychomotor agitation or retardation, no tremor or other abnormal movements.  Speech: Normal rate, volume, prosody  Gait/Station: Within normal limits  Mood: euthymic  Affect: Euthymic, full-range and Congruent with mood and topic of conversation  Thought Process: Linear, goal directed  Thought Associations: No loosening of  associations  Thought Content: normal  Sensorium: Alert and oriented to person, place, time and situation  Insight: Intact, as evidenced by awareness of symptom triggers  Judgment: Intact  Cognition: Cognitively intact to conversational testing with respect to attention, orientation, fund of knowledge, recent and remote memory, and language.  Testing: N/A.    Laboratory/Imaging/Diagnostic Tests   No recent test    Assessment/Plan   Overall Formulation and Differential Diagnosis:  Addie Cook is a 38 y.o. female who meets criteria for MDD, HI, OCD, and trauma-related disorder.  Interval Assessment:   female s/p delivery [birth year]24 with reported psychiatric history of depression and HI with panic (treated with escitalopram 20 mg daily since 2018) who presents for follow up.     Mood is overall stable and tolerating Lexapro 30 mg well. Continuing to breastfeeding.     Impression:  MDD moderate with peripartum exacerbation  HI  OCD  Grief following fetal loss  Rule out trauma and stressor-related disorder     PLAN:  - continue escitalopram 30 mg PO daily  - continue individual psychotherapy  -Counseled on lifestyle changes for fatigue  -Prioritize sleep: Aim for 7-9 hours of quality sleep per night.  -Balanced diet: ensure you are getting enough calories based on level of activity and stay hydrated  -Regular exercise  -Limit caffeine and alcohol  -Establish a routine  - Patient understands in case of medical/psychiatric emergency to call 911 or go to nearest ER  - Follow up in 2-3 month  Risk Assessment:  Imminent Risk of Suicide or Serious Self-Injury: Low Risk -- Risk factors include: History of trauma or abuse  Protective factors include:Denies current suicidal ideation, Denies history of suicide attempts , Future-oriented talk , Willingness to seek help and support , Skills in problem solving, conflict resolution, and nonviolent handling of disputes, Cultural and Jewish beliefs that discourage suicide and  support self-preservation , Access to a variety of clinical interventions , Receiving and engaged in care for mental, physical, and substance use disorders , History of adhering to treatment recommendations and/or prescribed medication regimen , Support through ongoing medical and mental healthcare relationships , Current/history of good response to treatment/meds , Interpersonal relationships and supports, e.g., family, friends, peers, community , and Restricted access to firearms or other lethal means of suicide   Imminent Risk of Violence or Homicide: Low Risk - Risk factors include: No significant risk factors identified on screening. Protective factors include: Lack of known history of harm to others , Lack of known history of violent ideation , Lack of known access to firearms , Sense of community, availability/access to resources and support , Sense of optimism, hope , Interpersonal competence , Affect regulation , Sense of self-efficacy, internal locus of control , and Positive, pro-social family/peer network   Treatment Plan:  There are no recently modified care plans to display for this patient.      Attestation Statements   Number of Minutes Spent Performing Evaluation & Management (E&M): 30 mns